# Patient Record
Sex: FEMALE | Race: WHITE | NOT HISPANIC OR LATINO | Employment: UNEMPLOYED | URBAN - METROPOLITAN AREA
[De-identification: names, ages, dates, MRNs, and addresses within clinical notes are randomized per-mention and may not be internally consistent; named-entity substitution may affect disease eponyms.]

---

## 2017-03-17 ENCOUNTER — APPOINTMENT (OUTPATIENT)
Dept: LAB | Facility: HOSPITAL | Age: 27
End: 2017-03-17
Attending: FAMILY MEDICINE
Payer: COMMERCIAL

## 2017-03-17 ENCOUNTER — TRANSCRIBE ORDERS (OUTPATIENT)
Dept: ADMINISTRATIVE | Facility: HOSPITAL | Age: 27
End: 2017-03-17

## 2017-03-17 DIAGNOSIS — Z11.1 SCREENING EXAMINATION FOR PULMONARY TUBERCULOSIS: ICD-10-CM

## 2017-03-17 DIAGNOSIS — Z01.84 IMMUNITY STATUS TESTING: Primary | ICD-10-CM

## 2017-03-17 DIAGNOSIS — Z01.84 IMMUNITY STATUS TESTING: ICD-10-CM

## 2017-03-17 LAB — RUBV IGG SERPL IA-ACNC: 155.4 IU/ML

## 2017-03-17 PROCEDURE — 36415 COLL VENOUS BLD VENIPUNCTURE: CPT

## 2017-03-17 PROCEDURE — 86765 RUBEOLA ANTIBODY: CPT

## 2017-03-17 PROCEDURE — 86480 TB TEST CELL IMMUN MEASURE: CPT

## 2017-03-17 PROCEDURE — 86762 RUBELLA ANTIBODY: CPT

## 2017-03-17 PROCEDURE — 86735 MUMPS ANTIBODY: CPT

## 2017-03-17 PROCEDURE — 86787 VARICELLA-ZOSTER ANTIBODY: CPT

## 2017-03-19 LAB
ANNOTATION COMMENT IMP: NORMAL
GAMMA INTERFERON BACKGROUND BLD IA-ACNC: 0.07 IU/ML
M TB IFN-G BLD-IMP: NEGATIVE
M TB IFN-G CD4+ BCKGRND COR BLD-ACNC: 0.06 IU/ML
M TB IFN-G CD4+ T-CELLS BLD-ACNC: 0.13 IU/ML
MITOGEN IGNF BLD-ACNC: >10 IU/ML
QUANTIFERON-TB GOLD IN TUBE: NORMAL
SERVICE CMNT-IMP: NORMAL

## 2017-03-21 LAB
MEV IGG SER QL: NORMAL
MUV IGG SER QL: NORMAL
VZV IGG SER IA-ACNC: ABNORMAL

## 2017-03-22 ENCOUNTER — GENERIC CONVERSION - ENCOUNTER (OUTPATIENT)
Dept: OTHER | Facility: OTHER | Age: 27
End: 2017-03-22

## 2017-03-29 ENCOUNTER — ALLSCRIPTS OFFICE VISIT (OUTPATIENT)
Dept: OTHER | Facility: OTHER | Age: 27
End: 2017-03-29

## 2017-05-04 ENCOUNTER — ALLSCRIPTS OFFICE VISIT (OUTPATIENT)
Dept: OTHER | Facility: OTHER | Age: 27
End: 2017-05-04

## 2017-12-07 ENCOUNTER — ALLSCRIPTS OFFICE VISIT (OUTPATIENT)
Dept: OTHER | Facility: OTHER | Age: 27
End: 2017-12-07

## 2017-12-07 DIAGNOSIS — Z34.01 ENCOUNTER FOR SUPERVISION OF NORMAL FIRST PREGNANCY IN FIRST TRIMESTER: ICD-10-CM

## 2017-12-12 ENCOUNTER — GENERIC CONVERSION - ENCOUNTER (OUTPATIENT)
Dept: OTHER | Facility: OTHER | Age: 27
End: 2017-12-12

## 2017-12-16 LAB — EXTERNAL HIV-1 ANTIBODY: NORMAL

## 2017-12-17 ENCOUNTER — LAB CONVERSION - ENCOUNTER (OUTPATIENT)
Dept: OTHER | Facility: OTHER | Age: 27
End: 2017-12-17

## 2017-12-17 LAB — TSH SERPL DL<=0.05 MIU/L-ACNC: 0.68 MIU/L

## 2017-12-18 ENCOUNTER — LAB CONVERSION - ENCOUNTER (OUTPATIENT)
Dept: OTHER | Facility: OTHER | Age: 27
End: 2017-12-18

## 2017-12-18 LAB
AB SCRN, RBC W/RFLX ID,TITER,AG (HISTORICAL): NORMAL
ABO GROUP BLD: NORMAL
BASOPHILS # BLD AUTO: 0.3 %
BASOPHILS # BLD AUTO: 27 CELLS/UL (ref 0–200)
DEPRECATED RDW RBC AUTO: 13.1 % (ref 11–15)
EOSINOPHIL # BLD AUTO: 1.3 %
EOSINOPHIL # BLD AUTO: 117 CELLS/UL (ref 15–500)
HCT VFR BLD AUTO: 37.9 % (ref 35–45)
HEPATITIS B SURFACE ANTIGEN (HISTORICAL): NORMAL
HGB BLD-MCNC: 12.8 G/DL (ref 11.7–15.5)
HIV AG/AB, 4TH GEN (HISTORICAL): NORMAL
LYMPHOCYTES # BLD AUTO: 1971 CELLS/UL (ref 850–3900)
LYMPHOCYTES # BLD AUTO: 21.9 %
MCH RBC QN AUTO: 29.7 PG (ref 27–33)
MCHC RBC AUTO-ENTMCNC: 33.8 G/DL (ref 32–36)
MCV RBC AUTO: 87.9 FL (ref 80–100)
MONOCYTES # BLD AUTO: 720 CELLS/UL (ref 200–950)
MONOCYTES (HISTORICAL): 8 %
NEUTROPHILS # BLD AUTO: 6165 CELLS/UL (ref 1500–7800)
NEUTROPHILS # BLD AUTO: 68.5 %
PLATELET # BLD AUTO: 256 THOUSAND/UL (ref 140–400)
PMV BLD AUTO: 10.6 FL (ref 7.5–12.5)
RBC # BLD AUTO: 4.31 MILLION/UL (ref 3.8–5.1)
RH BLD: NORMAL
RPR SCREEN (HISTORICAL): NORMAL
RUBELLA, IGG (HISTORICAL): 10.5 INDEX
TSH SERPL DL<=0.05 MIU/L-ACNC: 0.68 MIU/L
VARICELLA ZOSTER IGG (HISTORICAL): 273.5 INDEX
WBC # BLD AUTO: 9 THOUSAND/UL (ref 3.8–10.8)

## 2018-01-02 ENCOUNTER — ALLSCRIPTS OFFICE VISIT (OUTPATIENT)
Dept: OTHER | Facility: OTHER | Age: 28
End: 2018-01-02

## 2018-01-02 LAB — HCG, QUALITATIVE (HISTORICAL): NEGATIVE

## 2018-01-04 ENCOUNTER — GENERIC CONVERSION - ENCOUNTER (OUTPATIENT)
Dept: OTHER | Facility: OTHER | Age: 28
End: 2018-01-04

## 2018-01-04 LAB — CLINICAL COMMENT (HISTORICAL): NORMAL

## 2018-01-10 NOTE — PROGRESS NOTES
Assessment    1  Encounter for preventive health examination (V70 0) (Z00 00)   2  Body mass index (BMI) 20 0-20 9, adult (V85 1) (Z68 20)   3  Need for varicella vaccine (V05 4) (Z23)    Plan  Need for diphtheria-tetanus-pertussis (Tdap) vaccine    · Follow-up visit in 1 month Evaluation and Treatment  second varicella vaccine, RN visit   Status: Hold For - Scheduling  Requested for: [id number]   · Adacel 5-2-15 5 LF-MCG/0 5 Intramuscular Suspension; 0 5 ml IM x 1 dose; To Be Done: [id number]  Need for varicella vaccine    · Varicella; INJECT 0 5  ML Subcutaneous; To Be Done: [id number]    Discussion/Summary  health maintenance visit Currently, she eats a healthy diet  the risks and benefits of cervical cancer screening were discussed cervical cancer screening is current cervical cancer screening is managed by gyn Breast cancer screening: the risks and benefits of breast cancer screening were discussed and monthly self breast exam was advised  Colorectal cancer screening: colorectal cancer screening is not indicated  The immunizations will be given as outlined in the orders  Advice and education were given regarding nutrition, aerobic exercise, sunscreen use and seat belt use  Patient discussion: discussed with the patient  Chief Complaint  Seen for a CPE  er/cma  History of Present Illness  HPI: Here for pre-employment PE for [email or web address]  No complaints today  Had bloodwork for MMR and varicella immunity, as well as quantiferon gold  Review of Systems    Constitutional: No fever, no chills, feels well, no tiredness, no recent weight gain or weight loss  Eyes: No complaints of eye pain, no red eyes, no eyesight problems, no discharge, no dry eyes, no itching of eyes  ENT: no complaints of earache, no loss of hearing, no nose bleeds, no nasal discharge, no sore throat, no hoarseness     Cardiovascular: No complaints of slow heart rate, no fast heart rate, no chest pain, no palpitations, no leg claudication, no lower extremity edema  Respiratory: No complaints of shortness of breath, no wheezing, no cough, no SOB on exertion, no orthopnea, no PND  Gastrointestinal: No complaints of abdominal pain, no constipation, no nausea or vomiting, no diarrhea, no bloody stools  Genitourinary: No complaints of dysuria, no incontinence, no pelvic pain, no dysmenorrhea, no vaginal discharge or bleeding  Musculoskeletal: No complaints of arthralgias, no myalgias, no joint swelling or stiffness, no limb pain or swelling  Integumentary: No complaints of skin rash or lesions, no itching, no skin wounds, no breast pain or lump  Neurological: No complaints of headache, no confusion, no convulsions, no numbness, no dizziness or fainting, no tingling, no limb weakness, no difficulty walking  Psychiatric: Not suicidal, no sleep disturbance, no anxiety or depression, no change in personality, no emotional problems  Endocrine: No complaints of proptosis, no hot flashes, no muscle weakness, no deepening of the voice, no feelings of weakness  Hematologic/Lymphatic: No complaints of swollen glands, no swollen glands in the neck, does not bleed easily, does not bruise easily  Social History    · Former smoker (O18 85) (V43 808)    Current Meds   1  No Reported Medications Recorded   2  No Reported Medications Recorded    Allergies    1  No Known Drug Allergies    Vitals   Recorded: [id number] 11:15AM   Temperature 98 4 F   Heart Rate 80   Respiration 20   Systolic 96   Diastolic 60   Height 4 ft 10 in   Weight 99 lb    BMI Calculated 20 69   BSA Calculated 1 35   LMP [id number]     Physical Exam    Constitutional   General appearance: No acute distress, well appearing and well nourished  Eyes   Conjunctiva and lids: No swelling, erythema or discharge  Pupils and irises: Equal, round, reactive to light      Ears, Nose, Mouth, and Throat   External inspection of ears and nose: Normal     Otoscopic examination: Tympanic membranes translucent with normal light reflex  Canals patent without erythema  Hearing: Normal     Nasal mucosa, septum, and turbinates: Normal without edema or erythema  Lips, teeth, and gums: Normal, good dentition  Oropharynx: Normal with no erythema, edema, exudate or lesions  Neck   Neck: Supple, symmetric, trachea midline, no masses  Thyroid: Normal, no thyromegaly  Pulmonary   Respiratory effort: No increased work of breathing or signs of respiratory distress  Percussion of chest: Normal     Palpation of chest: Normal     Auscultation of lungs: Clear to auscultation  Cardiovascular   Palpation of heart: Normal PMI, no thrills  Auscultation of heart: Normal rate and rhythm, normal S1 and S2, no murmurs  Carotid pulses: 2+ bilaterally  Abdominal aorta: Normal     Femoral pulses: 2+ bilaterally  Pedal pulses: 2+ bilaterally  Examination of extremities for edema and/or varicosities: Normal     Abdomen   Abdomen: Non-tender, no masses  Liver and spleen: No hepatomegaly or splenomegaly  Examination for hernias: No hernia appreciated  Lymphatic   Palpation of lymph nodes in neck: No lymphadenopathy  Palpation of lymph nodes in axillae: No lymphadenopathy  Palpation of lymph nodes in groin: No lymphadenopathy  Palpation of lymph nodes in other areas: No lymphadenopathy  Musculoskeletal   Gait and station: Normal     Digits and nails: Normal without clubbing or cyanosis  Joints, bones, and muscles: Normal     Range of motion: Normal     Stability: Normal     Muscle strength/tone: Normal     Skin   Skin and subcutaneous tissue: Normal without rashes or lesions  Palpation of skin and subcutaneous tissue: Normal turgor  Neurologic   Cranial nerves: Cranial nerves II-XII intact  Reflexes: 2+ and symmetric  Sensation: No sensory loss      Psychiatric   Judgment and insight: Normal     Orientation to person, place, and time: Normal  Recent and remote memory: Intact  Mood and affect: Normal        Results/Data  PHQ-2 Adult Depression Screening 29Mar2017 11:19AM User, Ahs     Test Name Result Flag Reference   PHQ-2 Adult Depression Score 0     Over the last two weeks, how often have you been bothered by any of the following problems? Little interest or pleasure in doing things: Not at all - 0  Feeling down, depressed, or hopeless: Not at all - 0   PHQ-2 Adult Depression Screening Negative         Procedure    Procedure: Visual Acuity Test    Indication: routine screening  Inforrmation supplied by a Snellen chart     Results: 20/13 in both eyes without corrective device, 20/13 in the right eye without corrective device, 20/20 in the left eye without corrective device      Signatures   Electronically signed by : Townsend Schlatter, M D ; Mar 29 2017 11:46AM EST                       (Author)

## 2018-01-11 ENCOUNTER — ALLSCRIPTS OFFICE VISIT (OUTPATIENT)
Dept: PERINATAL CARE | Facility: CLINIC | Age: 28
End: 2018-01-11
Payer: COMMERCIAL

## 2018-01-11 PROCEDURE — 76805 OB US >/= 14 WKS SNGL FETUS: CPT | Performed by: OBSTETRICS & GYNECOLOGY

## 2018-01-12 VITALS
WEIGHT: 99 LBS | SYSTOLIC BLOOD PRESSURE: 96 MMHG | DIASTOLIC BLOOD PRESSURE: 60 MMHG | TEMPERATURE: 98.3 F | BODY MASS INDEX: 20.78 KG/M2 | RESPIRATION RATE: 20 BRPM | HEART RATE: 76 BPM | HEIGHT: 58 IN

## 2018-01-14 VITALS
BODY MASS INDEX: 20.78 KG/M2 | RESPIRATION RATE: 20 BRPM | DIASTOLIC BLOOD PRESSURE: 60 MMHG | HEART RATE: 80 BPM | HEIGHT: 58 IN | WEIGHT: 99 LBS | TEMPERATURE: 98.4 F | SYSTOLIC BLOOD PRESSURE: 96 MMHG

## 2018-01-15 NOTE — RESULT NOTES
Verified Results  (1) RUBEOLA ANTIBODIES, IGG [id number] 07:34AM Hotchkin, Severa Crow     Test Name Result Flag Reference   Rubeola AB, IgG IMMUNE  IMMUNE   Interpretation:    IMMUNE     - Presumed immune to Measles infection  EQUIVOCAL  - Suggest repeat in 2-4 weeks  NON-IMMUNE - Presumed non-immune to Measles infection  (1) MUMPS  ANTIBODIES, IGG [id number] 07:34AM Hotchkin, Severa Crow     Test Name Result Flag Reference   MUMPS IgG IMMUNE  IMMUNE   IMMUNE - Presumed immune to mumps infection  INTERPRETATION:    IMMUNE     - Presumed immune to Mumps IgG infection  EQUIVOCAL  - Suggest repeat testing in 2-4 weeks  NON-IMMUNE - Presumed non-immune to Mumps IgG infection  (1) VARICELLA ZOSTER IMMUNITY(IGG) [id number] 07:34AM Hotchkin, Severa Crow     Test Name Result Flag Reference   ANJALI ZOSTER IGG NON-IMMUNE A IMMUNE   INTERPRETATION:    IMMUNE     - Presumed immune to VZV IgG infection  EQUIVOCAL  - Suggest repeat testing in 2-4 weeks  NON-IMMUNE - Presumed non-immune to VZV IgG infection

## 2018-01-22 ENCOUNTER — GENERIC CONVERSION - ENCOUNTER (OUTPATIENT)
Dept: OTHER | Facility: OTHER | Age: 28
End: 2018-01-22

## 2018-01-23 VITALS
WEIGHT: 108 LBS | HEIGHT: 58 IN | HEART RATE: 88 BPM | BODY MASS INDEX: 22.67 KG/M2 | RESPIRATION RATE: 16 BRPM | SYSTOLIC BLOOD PRESSURE: 118 MMHG | DIASTOLIC BLOOD PRESSURE: 64 MMHG

## 2018-01-23 VITALS
HEIGHT: 58 IN | BODY MASS INDEX: 22.49 KG/M2 | DIASTOLIC BLOOD PRESSURE: 62 MMHG | SYSTOLIC BLOOD PRESSURE: 104 MMHG | HEART RATE: 88 BPM | WEIGHT: 107.13 LBS | RESPIRATION RATE: 16 BRPM

## 2018-01-23 NOTE — RESULT NOTES
Verified Results  (Q) THINPREP TIS PAP RFX HR HPV DNA AND C  TRACHOMATIS AND N  GONORRHOEAE [id number] 12:00AM Sharon Tam     Test Name Result Flag Reference   CLINICAL INFORMATION: None given     LMP: NONE GIVEN     PREV  PAP: NONE GIVEN     PREV  BX: NONE GIVEN     SOURCE: [facility]     STATEMENT OF ADEQUACY:      Satisfactory for evaluation  Endocervical/transformation zone component absent  Age and/or menstrual status not provided   INTERPRETATION/RESULT:      Negative for intraepithelial lesion or malignancy  COMMENT:      This Pap test has been evaluated with computer  assisted technology  CYTOTECHNOLOGIST:      ASHLEY HOFFMAN(ASCP)  CT screening location: [address] [facility], [address] [place] TRACHOMATIS$RNA, TMA NOT DETECTED  NOT Extension [place], TMA NOT DETECTED  NOT DETECTED   This test was performed using the APTIMA COMBO2 Assay  (Rosaura 32 )  The analytical performance characteristics of this   assay, when used to test SurePath specimens have  been determined by [employer]               Signatures   Electronically signed by : TADEO Duran ; Jan 4 2018  3:55PM EST                       (Author)

## 2018-01-23 NOTE — MISCELLANEOUS
Dear Jimmy Moffett,  I am happy to report that your Pap test collected during your recent  exam was normal  Based on the most recent guidelines, you will be due for your next Pap test in 3 years  However, I will continue to see you annually for your [facility] visit  If you have any questions, please do not hesitate to contact my office     Sincerely,    Kristi Martinez MD

## 2018-01-23 NOTE — MISCELLANEOUS
Message   Recorded as Task   Date: 12/07/2017 04:35 PM, Created By: Anisha Blanchard   Task Name: Care Coordination   Assigned To: Anisha Blanchard   Regarding Patient: Ermelinda Gilmore, Status: Active   CommentSantiago Don - 07 Dec 2017 4:35 PM     TASK CREATED  NEW OB INTAKE  INFERTILTY RECORDS ON YOUR DESK TO REVIEW  RELEASE OF RECORDS FORM SIGNED TO GET US RESULTS  PLEASE REVIEW TSH LEVEL - PT WOULD LIKE TO KNOW IF SYNTHROID NEED TO BE ADJUSTED  Antione Armas - 12 Dec 2017 10:00 AM     TASK REPLIED TO: Previously Assigned To [employer], [employer] and Joosy from august - looked good, will be repeating q trimester    everything else looks good  Thanks   Anisha Blanchard - 12 Dec 2017 11:20 AM     TASK EDITED  Spoke with Dr Blanton Party - Add TSH to current blood work  Spoke with patient, advised to continue synthroid right now and we will get tsh level with current blood work  Pt to get labs drawn on Saturday  Pt  to [facility] so I can fax lab slip  Anisha Blanchard - 12 Dec 2017 12:36 PM     TASK EDITED  Phone call from Walter  44  (pt ) please fax [facility] slip to [facility]  Faxed per request [phone]  Active Problems    1  Body mass index (BMI) 20 0-20 9, adult (V85 1) (Z68 20)   2  Encounter for supervision of normal first pregnancy in first trimester (V22 0) (Z34 01)   3  Hypothyroidism (244 9) (E03 9)   4  Irregular menstrual cycle (626 4) (N92 6)    Current Meds   1  Iron Supplement TABS; Therapy: (Recorded:25Ruc3878) to Recorded   2  Levothyroxine Sodium 75 MCG Oral Tablet; Therapy: (Recorded:80Nlw4279) to Recorded   3  Prenatal Vitamins TABS; Therapy: (Recorded:43Ogg9835) to Recorded   4  Vitamin D3 TABS; Therapy: (Recorded:61Xez9983) to Recorded    Allergies    1  Clomid    2  No Known Environmental Allergies   3   No Known Food Allergies    Signatures   Electronically signed by : Gideon Angulo, ; Dec 12 2017 12: 38PM EST                       (Author)

## 2018-01-24 VITALS
DIASTOLIC BLOOD PRESSURE: 61 MMHG | WEIGHT: 111 LBS | BODY MASS INDEX: 23.3 KG/M2 | SYSTOLIC BLOOD PRESSURE: 109 MMHG | HEIGHT: 58 IN

## 2018-01-24 NOTE — MISCELLANEOUS
Message   Recorded as Task   Date: 01/22/2018 10:01 AM, Created By: Sissy Hollis   Task Name: Follow Up   Assigned To: Sissy Hollis   Regarding Patient: Aishwarya Sarabia, Status: Active   CommentDarby Mews - 22 Jan 2018 10:01 AM     TASK CREATED  Caller: Sincer, Spouse; General Medical Question  18w OB  calling states wife is c/o transparent discharge ONCE a day for the past week  States she notices after a nap or when she gets up in the the morning she has approximately two drops of liquid on her underwear  States it has a yeast smell to it but it does not look like a yeast discharge  Just wet look to underwear  Does not smell like urine  No itching  Denies cramping or ctx  Lower back feels tender  Started doing exercises in book  Advised to wait on exercises for now and monitor  Routing to [facility] for advise  Please call  at [address] [facility] - 22 Jan 2018 10:06 AM     TASK REPLIED TO: Previously Assigned To Scarlett Colon                      agree with plan   Sissy Hollis - 22 Jan 2018 10:21 AM     TASK EDITED  Spoke with Walter Prescott 44  Advised to monitor  If increase or starts with cramp, ctx please call the office  Verbalized understanding  Active Problems    1  Body mass index (BMI) 20 0-20 9, adult (V85 1) (Z68 20)   2  Encounter for supervision of normal first pregnancy in first trimester (V22 0) (Z34 01)   3  Subclinical hypothyroidism (244 8) (E03 9)   4  Supervision of high risk pregnancy in second trimester (V23 9) (O09 92)   5  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

## 2018-01-30 ENCOUNTER — OB ABSTRACT (OUTPATIENT)
Dept: OBGYN CLINIC | Facility: CLINIC | Age: 28
End: 2018-01-30

## 2018-01-30 RX ORDER — FERROUS SULFATE 325(65) MG
TABLET ORAL
COMMUNITY

## 2018-01-30 RX ORDER — LEVOTHYROXINE SODIUM 0.07 MG/1
TABLET ORAL
COMMUNITY
End: 2018-01-31 | Stop reason: SDUPTHER

## 2018-01-30 RX ORDER — MELATONIN: COMMUNITY

## 2018-01-31 ENCOUNTER — OB ABSTRACT (OUTPATIENT)
Dept: OBGYN CLINIC | Facility: CLINIC | Age: 28
End: 2018-01-31

## 2018-01-31 ENCOUNTER — ROUTINE PRENATAL (OUTPATIENT)
Dept: OBGYN CLINIC | Facility: CLINIC | Age: 28
End: 2018-01-31

## 2018-01-31 VITALS — WEIGHT: 112 LBS | BODY MASS INDEX: 23.41 KG/M2 | DIASTOLIC BLOOD PRESSURE: 60 MMHG | SYSTOLIC BLOOD PRESSURE: 102 MMHG

## 2018-01-31 DIAGNOSIS — E03.8 OTHER SPECIFIED HYPOTHYROIDISM: ICD-10-CM

## 2018-01-31 DIAGNOSIS — Z3A.19 19 WEEKS GESTATION OF PREGNANCY: Primary | ICD-10-CM

## 2018-01-31 PROBLEM — E03.9 HYPOTHYROID IN PREGNANCY, ANTEPARTUM, SECOND TRIMESTER: Status: ACTIVE | Noted: 2018-01-31

## 2018-01-31 PROBLEM — O99.282 HYPOTHYROID IN PREGNANCY, ANTEPARTUM, SECOND TRIMESTER: Status: ACTIVE | Noted: 2018-01-31

## 2018-01-31 PROCEDURE — PNV: Performed by: OBSTETRICS & GYNECOLOGY

## 2018-01-31 RX ORDER — LEVOTHYROXINE SODIUM 0.07 MG/1
75 TABLET ORAL
Qty: 90 TABLET | Refills: 1 | Status: SHIPPED | OUTPATIENT
Start: 2018-01-31 | End: 2018-02-20 | Stop reason: SDUPTHER

## 2018-01-31 RX ORDER — LEVOTHYROXINE SODIUM 0.07 MG/1
75 TABLET ORAL DAILY
Qty: 90 TABLET | Refills: 1 | Status: SHIPPED | OUTPATIENT
Start: 2018-01-31

## 2018-01-31 NOTE — PROGRESS NOTES
[place] to go to [place]  Discussed frequent movement, leg exercises, and compression stockings    Will call to scheduled level 2

## 2018-01-31 NOTE — PROGRESS NOTES
No complaints  Wants to discuss nurse calls   Wants to discuss [facility] issue regarding urine   Found out sex of baby ( girl)   2nd trimester baby and me note due   Requests levothyroxine refills

## 2018-02-20 ENCOUNTER — ROUTINE PRENATAL (OUTPATIENT)
Dept: PERINATAL CARE | Facility: CLINIC | Age: 28
End: 2018-02-20
Payer: COMMERCIAL

## 2018-02-20 VITALS
WEIGHT: 116.2 LBS | HEIGHT: 58 IN | HEART RATE: 102 BPM | SYSTOLIC BLOOD PRESSURE: 99 MMHG | DIASTOLIC BLOOD PRESSURE: 63 MMHG | BODY MASS INDEX: 24.39 KG/M2

## 2018-02-20 DIAGNOSIS — Z36.86 ENCOUNTER FOR ANTENATAL SCREENING FOR CERVICAL LENGTH: ICD-10-CM

## 2018-02-20 DIAGNOSIS — Z3A.21 21 WEEKS GESTATION OF PREGNANCY: ICD-10-CM

## 2018-02-20 DIAGNOSIS — E03.8 SUBCLINICAL HYPOTHYROIDISM: Primary | ICD-10-CM

## 2018-02-20 PROBLEM — O31.10X0 VANISHING TWIN SYNDROME: Status: ACTIVE | Noted: 2018-01-11

## 2018-02-20 PROCEDURE — 76817 TRANSVAGINAL US OBSTETRIC: CPT | Performed by: OBSTETRICS & GYNECOLOGY

## 2018-02-20 PROCEDURE — 99213 OFFICE O/P EST LOW 20 MIN: CPT | Performed by: OBSTETRICS & GYNECOLOGY

## 2018-02-20 PROCEDURE — 76805 OB US >/= 14 WKS SNGL FETUS: CPT | Performed by: OBSTETRICS & GYNECOLOGY

## 2018-02-22 ENCOUNTER — DOCUMENTATION (OUTPATIENT)
Dept: OBGYN CLINIC | Facility: CLINIC | Age: 28
End: 2018-02-22

## 2018-02-22 NOTE — PROGRESS NOTES
US review: ORTEGA changed  Repeat TSH at next visit, and if still aroun 0 6, can decrease synthroid    TSH q trimester

## 2018-02-26 NOTE — PROGRESS NOTES
2018         RE: Johanna Leydi                           To: [id number] [address] [place] [address] [address]   [facility]   MR#: [id number]                                  [facility]   : Rabia [address]: [phone]:SUELLEN MIMS, [facility]   (Exam #: [id number])                           Fax: [phone]      The LMP of this 28year old,  G1, P0-0-0-0 patient was SEP 25 2017, giving   her an ORTEGA of 2018 and a current gestational age of 12 weeks 3 days   by dates  A sonographic examination was performed on 2018 using   real time equipment  The ultrasound examination was performed using   abdominal technique  The patient has a BMI of 23 2  Her blood pressure   today was 109/61  Earliest US on record: 17  7w0d  18  ORTEGA      Stoney is on prenatal vitamins, iron supplements, levothyroxine 75   micrograms and vitamin D3 tabs daily  She denies any known drug   allergies  She has irregular periods and was following with Dr Janiya Garza   from infertility  She had one cycle of Clomid which was not repeated    as she got pregnant on her own spontaneously in a different cycle  During   her infertility work up she was found to have subclinical hypothyroidism   that she was treated with levothyroxine for  She also was told she had   twins with a loss of one twin at seven weeks  She declined any genetic   screening in this pregnancy  she denies any use of cigarettes, alcohol or   illicit drugs  She denies any other past medical or surgical history or   any 1st generation family history of hypertension, diabetes, thrombosis or   congenital anomalies  She reports she did receive a flu shot this year        Cardiac motion was observed at 167 bpm       INDICATIONS      early fetal anatomy and growth   hypothyroidism   declines genetic screening      Exam Types      Level I      RESULTS      Fetus # 1 of 1   Breech presentation   Fetal growth appeared normal   Placenta Location = Anterior   No placenta previa   Placenta Grade = I      AMNIOTIC FLUID         Largest Vertical Pocket = 4 5 cm   Amniotic Fluid: Normal      MEASUREMENTS (* Included In Average GA)      AC               9 8 cm        15 weeks 4 days* (35%)   BPD              3 2 cm        15 weeks 6 days*   HC              11 9 cm        15 weeks 6 days*   Femur            1 8 cm        15 weeks 1 day *      Nuchal Fold      2 3 mm   NBL              3 8 mm      Humerus          2 1 cm        16 weeks 2 days  (51%)      Cerebellum       1 5 cm        16 weeks 1 day   CisternaMagna    3 6 mm      HC/AC           1 21   FL/AC           0 18   FL/BPD          0 56   EFW (Ac/Fl/Hc)   127 grams - 0 lbs 4 oz      THE AVERAGE GESTATIONAL AGE is 15 weeks 4 days +/- 7 days  ANATOMY      Head                                    See Details   Face/Neck                               See Details   Th  Cav  Normal   Heart                                   See Details   Abd  Cav  Normal   Stomach                                 Normal   Right Kidney                            Normal   Left Kidney                             Normal   Bladder                                 Normal   Abd  Wall                               Normal   Spine                                   See Details   Extrems                                 See Details   Genitalia                               Normal   Placenta                                Normal   Umbl  Cord                              Normal   Uterus                                  Normal   PCI                                     Normal      ANATOMY COMMENTS      Her survey of the fetal anatomy is not complete  No fetal structural abnormality or ultrasound marker for aneuploidy is   identified on the Level II ultrasound study today    The missed or limited   views above are secondary to her early gestational age  Fetal growth and   amniotic fluid volume appear normal   Active movement of the fetal body &   extremities was seen  There is no suspicion of a subchorionic bleed  The   placental cord insertion was normal       ADNEXA      The left ovary appeared normal and measured 1 3 x 2 9 x 0 8 cm with a   volume of 1 5 cc  The right ovary appeared normal and measured 2 5 x 2 7 x   1 7 cm with a volume of 5 9 cc  IMPRESSION      Agu[place] IUP   15 weeks and 4 days by this ultrasound  (ORTEGA=2018)   Breech presentation   Fetal growth appeared normal   Regular fetal heart rate of 167 bpm   Anterior placenta   No placenta previa      Ramer Ulisses      Dear Dr Chrystie Ormond,      Thank you for requesting a  consultation on your patient Ms Lyudmila Lockhart for the following indications:  history of a twin pregnancy   with a loss at seven weeks of one of the twins and history of subclinical   hypothyroidism  The patient was informed of the findings and counseled about the   limitations of the exam in detecting all forms of fetal congenital   abnormalities  She denies any vaginal bleeding or uterine cramping/contractions  Exam shows she is comfortable and her abdomen is non tender  Follow up recommended:   1  Recommend a follow-up anatomy scan at 20 weeks  2   Recommend  follow-up of her TSH Q trimester in this pregnancy and   titrating her medication to keep her TSH less then three in the 2nd and   3rd trimester  The majority of time (greater then 50%) was spent on counseling and   coordination of care of this patient and /or family member  Approximate   face to face time was 15 minutes  DIEGO Adan M D  Maternal-Fetal Medicine   Electronically signed 18 13:33           Electronically signed Jed AVILA    2018 11:08AM EST Acknowledgement

## 2018-03-05 ENCOUNTER — ROUTINE PRENATAL (OUTPATIENT)
Dept: OBGYN CLINIC | Facility: CLINIC | Age: 28
End: 2018-03-05

## 2018-03-05 VITALS — DIASTOLIC BLOOD PRESSURE: 62 MMHG | WEIGHT: 117 LBS | SYSTOLIC BLOOD PRESSURE: 98 MMHG | BODY MASS INDEX: 24.45 KG/M2

## 2018-03-05 DIAGNOSIS — Z34.83 ENCOUNTER FOR SUPERVISION OF OTHER NORMAL PREGNANCY IN THIRD TRIMESTER: Primary | ICD-10-CM

## 2018-03-05 DIAGNOSIS — E03.8 SUBCLINICAL HYPOTHYROIDISM: ICD-10-CM

## 2018-03-05 DIAGNOSIS — Z3A.23 23 WEEKS GESTATION OF PREGNANCY: ICD-10-CM

## 2018-03-05 PROCEDURE — PNV: Performed by: OBSTETRICS & GYNECOLOGY

## 2018-03-07 NOTE — PROGRESS NOTES
Education  Baby & Me Education 1st Trimester:   First Trimester Education provided:   benefits of breastfeeding, importance of exclusive breastfeeding, early initiation of breastfeeding, exclusive breastfeeding for the first 6 months and Pregnancy Essentials Reference Guide given   The patient is planning on breastfeeding  Individualized education given: BABY AND ME SUPPORT [facility] AND SUSAN INFORMATION GIVEN AND REVIEWED     Prenatal education provided by: Saulo Peres RN      Signatures   Electronically signed by : Meka Hartman, ; Dec  7 2017  4:25PM EST                       (Author)

## 2018-03-20 ENCOUNTER — TELEPHONE (OUTPATIENT)
Dept: OBGYN CLINIC | Facility: CLINIC | Age: 28
End: 2018-03-20

## 2018-03-20 NOTE — TELEPHONE ENCOUNTER
25w2d OB  calling states wife is having few drops of blood from nose only in the morning when she wakes up  Advised this can be normal in pregnancy  Advised increase fluid intake, cold steam humidifier to increase moisture in the home, small amount of Vaseline in the nose can also help with dryness  Routing to provider for further recommendations

## 2018-04-12 DIAGNOSIS — R73.09 ELEVATED GLUCOSE: Primary | ICD-10-CM

## 2018-04-12 LAB
BASOPHILS # BLD AUTO: 30 CELLS/UL (ref 0–200)
BASOPHILS NFR BLD AUTO: 0.3 %
EOSINOPHIL # BLD AUTO: 190 CELLS/UL (ref 15–500)
EOSINOPHIL NFR BLD AUTO: 1.9 %
ERYTHROCYTE [DISTWIDTH] IN BLOOD BY AUTOMATED COUNT: 12.1 % (ref 11–15)
GLUCOSE 1H P 50 G GLC PO SERPL-MCNC: 155 MG/DL
HCT VFR BLD AUTO: 34.4 % (ref 35–45)
HGB BLD-MCNC: 12 G/DL (ref 11.7–15.5)
LYMPHOCYTES # BLD AUTO: 2020 CELLS/UL (ref 850–3900)
LYMPHOCYTES NFR BLD AUTO: 20.2 %
MCH RBC QN AUTO: 32.7 PG (ref 27–33)
MCHC RBC AUTO-ENTMCNC: 34.9 G/DL (ref 32–36)
MCV RBC AUTO: 93.7 FL (ref 80–100)
MONOCYTES # BLD AUTO: 820 CELLS/UL (ref 200–950)
MONOCYTES NFR BLD AUTO: 8.2 %
NEUTROPHILS # BLD AUTO: 6940 CELLS/UL (ref 1500–7800)
NEUTROPHILS NFR BLD AUTO: 69.4 %
PLATELET # BLD AUTO: 209 THOUSAND/UL (ref 140–400)
PMV BLD REES-ECKER: 11.4 FL (ref 7.5–12.5)
RBC # BLD AUTO: 3.67 MILLION/UL (ref 3.8–5.1)
SERVICE CMNT-IMP: ABNORMAL
T4 SERPL-MCNC: 10.6 MCG/DL (ref 4.5–12)
TSH SERPL-ACNC: 1.15 MIU/L
WBC # BLD AUTO: 10 THOUSAND/UL (ref 3.8–10.8)

## 2018-04-16 ENCOUNTER — ROUTINE PRENATAL (OUTPATIENT)
Dept: OBGYN CLINIC | Facility: CLINIC | Age: 28
End: 2018-04-16
Payer: COMMERCIAL

## 2018-04-16 VITALS — BODY MASS INDEX: 26.33 KG/M2 | SYSTOLIC BLOOD PRESSURE: 110 MMHG | WEIGHT: 126 LBS | DIASTOLIC BLOOD PRESSURE: 74 MMHG

## 2018-04-16 DIAGNOSIS — Z23 NEED FOR TDAP VACCINATION: ICD-10-CM

## 2018-04-16 DIAGNOSIS — Z3A.29 29 WEEKS GESTATION OF PREGNANCY: Primary | ICD-10-CM

## 2018-04-16 PROCEDURE — PNV: Performed by: OBSTETRICS & GYNECOLOGY

## 2018-04-16 PROCEDURE — 90471 IMMUNIZATION ADMIN: CPT

## 2018-04-16 PROCEDURE — 90715 TDAP VACCINE 7 YRS/> IM: CPT

## 2018-04-16 NOTE — PROGRESS NOTES
Reviewed 28 week labs    Will do 3 hour glucose test  Will continue thyroid medication  Offered PT for sciatic nerve pain and low back pain -patient declined  Reviewed  labor precautions  Review 28 week folder  Tdap given  Follow-up in 2 weeks

## 2018-04-16 NOTE — PROGRESS NOTES
Red folder/breast pump given   3rd trimester baby and me note due   Wants to discuss 1 hr glucose test    Wants to discuss Thyroid med because [facility] stated she could stop it   C/o sciatic nerve pain , especially when lying on her side   C/o heel pain   C/o lower back pain   C/o tightening one day underneath breasts with lower pelvic cramping  hasn't had it since

## 2018-04-20 LAB
GLUCOSE 1H P CHAL SERPL-MCNC: 157 MG/DL
GLUCOSE 2H P CHAL SERPL-MCNC: 109 MG/DL
GLUCOSE 3H P 100 G GLC PO SERPL-MCNC: 102 MG/DL
GLUCOSE P FAST SERPL-MCNC: 70 MG/DL (ref 65–94)

## 2018-05-01 ENCOUNTER — ROUTINE PRENATAL (OUTPATIENT)
Dept: OBGYN CLINIC | Facility: CLINIC | Age: 28
End: 2018-05-01

## 2018-05-01 VITALS — SYSTOLIC BLOOD PRESSURE: 108 MMHG | WEIGHT: 125 LBS | BODY MASS INDEX: 26.13 KG/M2 | DIASTOLIC BLOOD PRESSURE: 60 MMHG

## 2018-05-01 DIAGNOSIS — Z3A.31 31 WEEKS GESTATION OF PREGNANCY: ICD-10-CM

## 2018-05-01 DIAGNOSIS — Z34.03 ENCOUNTER FOR SUPERVISION OF NORMAL FIRST PREGNANCY IN THIRD TRIMESTER: Primary | ICD-10-CM

## 2018-05-01 PROCEDURE — PNV: Performed by: OBSTETRICS & GYNECOLOGY

## 2018-05-01 NOTE — PROGRESS NOTES
Wants copy of consent - given to patient  Ok with blood transfusion if needed  Rib pain - likely positional/baby pressing on nerve  Discussed cat/cow stretches, position change

## 2018-05-07 ENCOUNTER — ULTRASOUND (OUTPATIENT)
Dept: PERINATAL CARE | Facility: CLINIC | Age: 28
End: 2018-05-07
Payer: COMMERCIAL

## 2018-05-07 VITALS
BODY MASS INDEX: 26.91 KG/M2 | WEIGHT: 128.2 LBS | HEIGHT: 58 IN | DIASTOLIC BLOOD PRESSURE: 74 MMHG | HEART RATE: 98 BPM | SYSTOLIC BLOOD PRESSURE: 105 MMHG

## 2018-05-07 DIAGNOSIS — O40.3XX0 POLYHYDRAMNIOS IN THIRD TRIMESTER COMPLICATION, SINGLE OR UNSPECIFIED FETUS: Primary | ICD-10-CM

## 2018-05-07 DIAGNOSIS — E03.8 SUBCLINICAL HYPOTHYROIDISM: ICD-10-CM

## 2018-05-07 DIAGNOSIS — Z3A.32 32 WEEKS GESTATION OF PREGNANCY: ICD-10-CM

## 2018-05-07 PROCEDURE — 76816 OB US FOLLOW-UP PER FETUS: CPT | Performed by: OBSTETRICS & GYNECOLOGY

## 2018-05-07 PROCEDURE — 99213 OFFICE O/P EST LOW 20 MIN: CPT | Performed by: OBSTETRICS & GYNECOLOGY

## 2018-05-07 NOTE — PROGRESS NOTES
Normal fetal growth and anatomy seen  Her missed anatomy is completed today  Today polyhydramnios is seen  No evidence for malformations to suggest aneuploidy, fetal GI obstruction, fetal anemia, or maternal diabetes is found in her work up  She recently passed her 3hr ogtt  Recommend an ANTONI in two weeks and if still elevated then would start weekly NST/ANTONI till her polyhydramnios resolves as the condition is associated with an increased risk for stillbirth  Her TSH of 1 15 on 04/11 is in the recommended range for pregnancy  She should continue her levothyroxine at 75 micrograms daily  With a diagnosis of subclinical hypothyroidism she can stop her medication after birth as long as she has another TSH drawn at six weeks postpartum to prove her TSH remains in the normal range for a non pregnant patient      Vani Fay MD

## 2018-05-15 ENCOUNTER — ROUTINE PRENATAL (OUTPATIENT)
Dept: OBGYN CLINIC | Facility: CLINIC | Age: 28
End: 2018-05-15

## 2018-05-15 VITALS — DIASTOLIC BLOOD PRESSURE: 56 MMHG | SYSTOLIC BLOOD PRESSURE: 94 MMHG | BODY MASS INDEX: 26.58 KG/M2 | WEIGHT: 127.2 LBS

## 2018-05-15 DIAGNOSIS — Z3A.32 32 WEEKS GESTATION OF PREGNANCY: Primary | ICD-10-CM

## 2018-05-15 DIAGNOSIS — O40.3XX0 POLYHYDRAMNIOS IN THIRD TRIMESTER COMPLICATION, SINGLE OR UNSPECIFIED FETUS: ICD-10-CM

## 2018-05-15 PROCEDURE — PNV: Performed by: OBSTETRICS & GYNECOLOGY

## 2018-05-15 NOTE — PROGRESS NOTES
58-year-old G1 at 33 2 weeks gestational age  Recently diagnosed with polyhydramnios - repeat fluid check next week at    Reviewed delivery consent    Signed today  Reviewed  labor precautions  Follow-up in 2 weeks

## 2018-05-21 ENCOUNTER — ROUTINE PRENATAL (OUTPATIENT)
Dept: PERINATAL CARE | Facility: CLINIC | Age: 28
End: 2018-05-21
Payer: COMMERCIAL

## 2018-05-21 VITALS
BODY MASS INDEX: 25.84 KG/M2 | HEART RATE: 90 BPM | DIASTOLIC BLOOD PRESSURE: 65 MMHG | HEIGHT: 59 IN | WEIGHT: 128.2 LBS | SYSTOLIC BLOOD PRESSURE: 100 MMHG

## 2018-05-21 DIAGNOSIS — O40.3XX0 POLYHYDRAMNIOS IN THIRD TRIMESTER COMPLICATION, SINGLE OR UNSPECIFIED FETUS: Primary | ICD-10-CM

## 2018-05-21 DIAGNOSIS — Z3A.32 32 WEEKS GESTATION OF PREGNANCY: ICD-10-CM

## 2018-05-21 PROCEDURE — 76815 OB US LIMITED FETUS(S): CPT | Performed by: OBSTETRICS & GYNECOLOGY

## 2018-05-21 PROCEDURE — 59025 FETAL NON-STRESS TEST: CPT | Performed by: OBSTETRICS & GYNECOLOGY

## 2018-05-22 NOTE — PROGRESS NOTES
Her polyhydramnios continues to be present    Recommend weekly NST / ANTONI till either delivery or returns to normal   Farideh Gabriel MD

## 2018-05-29 ENCOUNTER — ROUTINE PRENATAL (OUTPATIENT)
Dept: OBGYN CLINIC | Facility: CLINIC | Age: 28
End: 2018-05-29

## 2018-05-29 VITALS — BODY MASS INDEX: 25.73 KG/M2 | SYSTOLIC BLOOD PRESSURE: 98 MMHG | DIASTOLIC BLOOD PRESSURE: 58 MMHG | WEIGHT: 127.4 LBS

## 2018-05-29 DIAGNOSIS — Z3A.35 35 WEEKS GESTATION OF PREGNANCY: ICD-10-CM

## 2018-05-29 DIAGNOSIS — O40.3XX0 POLYHYDRAMNIOS IN THIRD TRIMESTER COMPLICATION, SINGLE OR UNSPECIFIED FETUS: Primary | ICD-10-CM

## 2018-05-29 PROCEDURE — PNV: Performed by: OBSTETRICS & GYNECOLOGY

## 2018-05-29 NOTE — PROGRESS NOTES
25-year-old G1 at 34 2 weeks gestational age  Overall she is feeling well  Continues to have polyhydramnios -weekly NST and ANTONI -appointment is on Friday  Transverse lie today  Reviewed  labor precautions  Reviewed birth plan -explained that nothing is set stone and that patient may change her planned at any point  Reviewed that if baby is not vertex at next visit we will need to discuss version versus expected management versus planned   Follow-up in 1 week

## 2018-05-29 NOTE — PROGRESS NOTES
Pt would like to discuss her birth plan  Pt feels good, no complaints  NST/ANTONI scheduled at [facility] on 6/1/18

## 2018-06-01 ENCOUNTER — ROUTINE PRENATAL (OUTPATIENT)
Dept: PERINATAL CARE | Facility: CLINIC | Age: 28
End: 2018-06-01
Payer: COMMERCIAL

## 2018-06-01 VITALS
DIASTOLIC BLOOD PRESSURE: 73 MMHG | WEIGHT: 128.6 LBS | HEART RATE: 101 BPM | SYSTOLIC BLOOD PRESSURE: 105 MMHG | BODY MASS INDEX: 25.92 KG/M2 | HEIGHT: 59 IN

## 2018-06-01 DIAGNOSIS — Z3A.35 35 WEEKS GESTATION OF PREGNANCY: ICD-10-CM

## 2018-06-01 DIAGNOSIS — O40.3XX0 POLYHYDRAMNIOS IN THIRD TRIMESTER COMPLICATION, SINGLE OR UNSPECIFIED FETUS: Primary | ICD-10-CM

## 2018-06-01 PROCEDURE — 59025 FETAL NON-STRESS TEST: CPT | Performed by: OBSTETRICS & GYNECOLOGY

## 2018-06-01 PROCEDURE — 76815 OB US LIMITED FETUS(S): CPT | Performed by: OBSTETRICS & GYNECOLOGY

## 2018-06-05 ENCOUNTER — ROUTINE PRENATAL (OUTPATIENT)
Dept: OBGYN CLINIC | Facility: CLINIC | Age: 28
End: 2018-06-05

## 2018-06-05 VITALS — DIASTOLIC BLOOD PRESSURE: 60 MMHG | SYSTOLIC BLOOD PRESSURE: 102 MMHG | BODY MASS INDEX: 25.85 KG/M2 | WEIGHT: 128 LBS

## 2018-06-05 DIAGNOSIS — O40.3XX0 POLYHYDRAMNIOS IN THIRD TRIMESTER COMPLICATION, SINGLE OR UNSPECIFIED FETUS: ICD-10-CM

## 2018-06-05 DIAGNOSIS — Z34.03 ENCOUNTER FOR SUPERVISION OF NORMAL FIRST PREGNANCY IN THIRD TRIMESTER: Primary | ICD-10-CM

## 2018-06-05 DIAGNOSIS — Z3A.36 36 WEEKS GESTATION OF PREGNANCY: ICD-10-CM

## 2018-06-05 DIAGNOSIS — O32.2XX0 TRANSVERSE LIE OF FETUS, SINGLE OR UNSPECIFIED FETUS: ICD-10-CM

## 2018-06-05 PROCEDURE — 87653 STREP B DNA AMP PROBE: CPT | Performed by: OBSTETRICS & GYNECOLOGY

## 2018-06-05 PROCEDURE — PNV: Performed by: OBSTETRICS & GYNECOLOGY

## 2018-06-05 NOTE — PROGRESS NOTES
GBS done: Yes  PCN allergy: No  Labor precautions given  [address] reviewed  Baby is transverse  Has  appt end of week

## 2018-06-08 ENCOUNTER — ROUTINE PRENATAL (OUTPATIENT)
Dept: PERINATAL CARE | Facility: CLINIC | Age: 28
End: 2018-06-08
Payer: COMMERCIAL

## 2018-06-08 VITALS
HEART RATE: 106 BPM | WEIGHT: 129.2 LBS | HEIGHT: 59 IN | BODY MASS INDEX: 26.04 KG/M2 | SYSTOLIC BLOOD PRESSURE: 102 MMHG | DIASTOLIC BLOOD PRESSURE: 67 MMHG

## 2018-06-08 DIAGNOSIS — Z3A.36 36 WEEKS GESTATION OF PREGNANCY: ICD-10-CM

## 2018-06-08 DIAGNOSIS — O40.3XX0 POLYHYDRAMNIOS IN THIRD TRIMESTER COMPLICATION, SINGLE OR UNSPECIFIED FETUS: Primary | ICD-10-CM

## 2018-06-08 LAB — GP B STREP DNA SPEC QL NAA+PROBE: NORMAL

## 2018-06-08 PROCEDURE — 76818 FETAL BIOPHYS PROFILE W/NST: CPT | Performed by: OBSTETRICS & GYNECOLOGY

## 2018-06-12 ENCOUNTER — ROUTINE PRENATAL (OUTPATIENT)
Dept: OBGYN CLINIC | Facility: CLINIC | Age: 28
End: 2018-06-12

## 2018-06-12 VITALS — WEIGHT: 128 LBS | SYSTOLIC BLOOD PRESSURE: 110 MMHG | DIASTOLIC BLOOD PRESSURE: 62 MMHG | BODY MASS INDEX: 25.85 KG/M2

## 2018-06-12 DIAGNOSIS — O40.3XX0 POLYHYDRAMNIOS IN THIRD TRIMESTER COMPLICATION, SINGLE OR UNSPECIFIED FETUS: Primary | ICD-10-CM

## 2018-06-12 DIAGNOSIS — Z3A.37 37 WEEKS GESTATION OF PREGNANCY: ICD-10-CM

## 2018-06-12 PROCEDURE — PNV: Performed by: OBSTETRICS & GYNECOLOGY

## 2018-06-12 NOTE — PROGRESS NOTES
59-year-old G1 at 37 2 weeks gestational age  Transverse position fetal head at maternal left backup  Continuing antepartum fetal surveillance for polyhydramnios  Reviewed term labor precautions    Reviewed with patient and  options for delivery if baby in transverse position at time of labor  Currently the baby is had a variable presentation  Discussed external cephalic version versus expected management versus scheduling a primary  section  Given the polyhydramnios I reviewed with the patient her  that even a successful there is a high likelihood that the baby will move back into transverse position  At this point the patient her  would like to plan a primary  section at 39 weeks  The risks and benefits were reviewed  We reviewed that even if she has a C-sections this time, in future pregnancies she may attempt a vaginal delivery as long as there is 18 months between deliveries      Currently a primary  section is scheduled at 18 at 12:30 p m  in the afternoon with Dr Marcelina Rocha

## 2018-06-15 ENCOUNTER — ROUTINE PRENATAL (OUTPATIENT)
Dept: PERINATAL CARE | Facility: CLINIC | Age: 28
End: 2018-06-15
Payer: COMMERCIAL

## 2018-06-15 ENCOUNTER — APPOINTMENT (OUTPATIENT)
Dept: PERINATAL CARE | Facility: CLINIC | Age: 28
End: 2018-06-15
Payer: COMMERCIAL

## 2018-06-15 VITALS
BODY MASS INDEX: 27.5 KG/M2 | SYSTOLIC BLOOD PRESSURE: 108 MMHG | WEIGHT: 131 LBS | DIASTOLIC BLOOD PRESSURE: 75 MMHG | HEIGHT: 58 IN | HEART RATE: 92 BPM

## 2018-06-15 DIAGNOSIS — O40.3XX0 POLYHYDRAMNIOS IN THIRD TRIMESTER COMPLICATION, SINGLE OR UNSPECIFIED FETUS: ICD-10-CM

## 2018-06-15 DIAGNOSIS — Z3A.37 37 WEEKS GESTATION OF PREGNANCY: ICD-10-CM

## 2018-06-15 PROCEDURE — 76818 FETAL BIOPHYS PROFILE W/NST: CPT | Performed by: OBSTETRICS & GYNECOLOGY

## 2018-06-21 ENCOUNTER — ROUTINE PRENATAL (OUTPATIENT)
Dept: OBGYN CLINIC | Facility: CLINIC | Age: 28
End: 2018-06-21

## 2018-06-21 VITALS — BODY MASS INDEX: 27.17 KG/M2 | DIASTOLIC BLOOD PRESSURE: 60 MMHG | WEIGHT: 130 LBS | SYSTOLIC BLOOD PRESSURE: 100 MMHG

## 2018-06-21 DIAGNOSIS — Z3A.38 38 WEEKS GESTATION OF PREGNANCY: Primary | ICD-10-CM

## 2018-06-21 PROCEDURE — PNV: Performed by: OBSTETRICS & GYNECOLOGY

## 2018-06-21 NOTE — PROGRESS NOTES
Stoney is a 28y o  year old  at 38w3d for routine prenatal visit  GBS neg  Polyhydramnios- getting APFS at [facility]  Has ultrasound scheduled for tomorrow  SVE: cl/th/h/post  Labor precautions given  [facility] reviewed  TAUS- transverse, fetal head on maternal left  Unstable lie- has c/s scheduled for  at 12:30  If vertex, would like vaginal delivery  hibiclens and showering instructions given today

## 2018-06-22 ENCOUNTER — ROUTINE PRENATAL (OUTPATIENT)
Dept: PERINATAL CARE | Facility: CLINIC | Age: 28
End: 2018-06-22
Payer: COMMERCIAL

## 2018-06-22 VITALS
WEIGHT: 131.4 LBS | BODY MASS INDEX: 27.58 KG/M2 | HEART RATE: 89 BPM | DIASTOLIC BLOOD PRESSURE: 68 MMHG | SYSTOLIC BLOOD PRESSURE: 101 MMHG | HEIGHT: 58 IN

## 2018-06-22 DIAGNOSIS — Z3A.38 38 WEEKS GESTATION OF PREGNANCY: ICD-10-CM

## 2018-06-22 DIAGNOSIS — Z36.89 ENCOUNTER FOR ULTRASOUND TO CHECK FETAL GROWTH: ICD-10-CM

## 2018-06-22 DIAGNOSIS — O40.3XX0 POLYHYDRAMNIOS IN THIRD TRIMESTER COMPLICATION, SINGLE OR UNSPECIFIED FETUS: ICD-10-CM

## 2018-06-22 PROCEDURE — 99213 OFFICE O/P EST LOW 20 MIN: CPT | Performed by: OBSTETRICS & GYNECOLOGY

## 2018-06-22 PROCEDURE — 76818 FETAL BIOPHYS PROFILE W/NST: CPT | Performed by: OBSTETRICS & GYNECOLOGY

## 2018-06-22 PROCEDURE — 76816 OB US FOLLOW-UP PER FETUS: CPT | Performed by: OBSTETRICS & GYNECOLOGY

## 2018-06-22 NOTE — PROGRESS NOTES
[address]: Ms Behzad Fontana was seen today at 38w5d for NST (found under the pregnancy episode) which I reviewed the RN assessment and agree, and fetal growth ultrasound (see ultrasound report under OB procedures tab)  See ultrasound report under "OB Procedures" tab  Please don't hesitate to contact our office with any concerns or questions    Marva Mckeon MD

## 2018-06-24 NOTE — H&P
H&P Exam - Obstetrics   Stoney Mccormick 28 y o  female MRN: [id number]  Unit/Bed#:  Encounter: [id number]    Assessment/Plan     Assessment:  IUP at 39 wks  Transverse lie of fetus  Polyhydramnios    Plan:  Admit to L&D  Labs/IVF  Anesthesia consult    History of Present Illness   Chief Complaint: Elective     HPI:  Helen Glass is a 28 y o   female with an ORTEGA of 2018, by Ultrasound at 38w6d weeks gestation who is being admitted for Elective   Her current obstetrical history is significant for polyhydramnios, transverse lie of fetus  Contractions: irregular  Leakage of fluid: None  Bleeding: None  Fetal movement: present  EFW 8#11 oz on   Transverse lie confirmed by bedside ultrasound  Pregnancy complications: polydramnios, transverse fetal lie  Review of Systems   Constitutional: Negative for chills and fever  Respiratory: Negative for shortness of breath  Cardiovascular: Negative for chest pain  Gastrointestinal: Negative for abdominal pain and nausea  Genitourinary: Negative for dysuria and vaginal bleeding  Neurological: Negative for light-headedness  Historical Information   OB History    Para Term  AB Living   1             SAB TAB Ectopic Multiple Live Births                  # Outcome Date GA Lbr Sundar/2nd Weight Sex Delivery Anes PTL Lv   1 Current                 Baby complications/comments: none    Past Medical History:   Diagnosis Date    Disease of thyroid gland     SUBCLINICAL HYPOTHYROIDISM     Female infertility     Vanishing twin syndrome      No past surgical history on file    Social History   History   Alcohol Use No     History   Drug Use No     History   Smoking Status    Never Smoker   Smokeless Tobacco    Never Used     Family History: non-contributory    Meds/Allergies   all medications and allergies reviewed       Allergies   Allergen Reactions    Clomiphene Other (See Comments)     CLOMID   Per pt if pt would move hand in front she will see images of her hand still moving       Objective   Vitals: Last menstrual period 09/18/2017, currently breastfeeding  There is no height or weight on file to calculate BMI  Invasive Devices          No matching active lines, drains, or airways          Physical Exam   Constitutional: She is oriented to person, place, and time  She appears well-developed  HENT:   Head: Normocephalic and atraumatic  Cardiovascular: Normal rate and regular rhythm  Pulmonary/Chest: No respiratory distress  Abdominal: Soft  There is no tenderness  Gravid     Musculoskeletal: She exhibits edema  Neurological: She is alert and oriented to person, place, and time  Skin: Skin is warm  Psychiatric: She has a normal mood and affect  Her behavior is normal           Prenatal Labs: I have personally reviewed pertinent reports    , Blood Type:   Lab Results   Component Value Date/Time    ABO Grouping A 12/16/2017 11:07 AM     , D (Rh type):   Lab Results   Component Value Date/Time    Rh Factor RH(D) POSITIVE 12/16/2017 11:07 AM     , Antibody Screen: No results found for: ANTIBODYSCR , HCT/HGB:   Lab Results   Component Value Date/Time    Hematocrit 34 4 (L) 04/11/2018 02:28 PM    Hematocrit 37 9 12/16/2017 11:07 AM    Hemoglobin 12 0 04/11/2018 02:28 PM    Hemoglobin 12 8 12/16/2017 11:07 AM      , MCV:   Lab Results   Component Value Date/Time    MCV 93 7 04/11/2018 02:28 PM    MCV 87 9 12/16/2017 11:07 AM      , Platelets:   Lab Results   Component Value Date/Time    Platelets 274 30/43/8994 11:07 AM    Platelet Count 242 79/81/7150 02:28 PM      , 1 hour Glucola:   Lab Results   Component Value Date/Time    Glucose 155 (H) 04/11/2018 02:28 PM   , Varicella:   Lab Results   Component Value Date/Time    Varicella IgG NON-IMMUNE (A) 03/17/2017 07:34 AM       , Rubella:   Lab Results   Component Value Date/Time    Rubella IgG Quant 155 4 03/17/2017 07:34 AM        , VDRL/RPR:   Lab Results   Component Value Date/Time    RPR SCREEN NON-REACTIVE 12/16/2017 11:07 AM      , Urine Culture/Screen: No results found for: URINECX    , Urine Drug Screen: No results found for: AMPHETUR, BARBTUR, BDZUR, THCUR, COCAINEUR, METHADONEUR, OPIATEUR, PCPUR, MTHAMUR, ECSTASYUR, TRICYCLICSUR, Hep B:   Lab Results   Component Value Date/Time    HEPATITIS B SURFACE ANTIGEN NON-REACTIVE 12/16/2017 11:07 AM     , HIV:   Lab Results   Component Value Date/Time    External HIV-1 Antibody neg 12/16/2017 11:26 AM     , Chlamydia: No results found for: EXTCHLAMYDIA  , Gonorrhea: No results found for: LABNGO, EXTGONSCRN  , Group B Strep:    Lab Results   Component Value Date/Time    Strep Grp B PCR Negative for Beta Hemolytic Strep Grp B by PCR 06/05/2018 01:32 PM          Imaging, EKG, Pathology, and Other Studies: I have personally reviewed pertinent reports

## 2018-06-25 ENCOUNTER — ANESTHESIA EVENT (INPATIENT)
Dept: LABOR AND DELIVERY | Facility: HOSPITAL | Age: 28
End: 2018-06-25
Payer: COMMERCIAL

## 2018-06-26 ENCOUNTER — HOSPITAL ENCOUNTER (INPATIENT)
Facility: HOSPITAL | Age: 28
LOS: 3 days | Discharge: HOME | End: 2018-06-29
Attending: OBSTETRICS & GYNECOLOGY | Admitting: OBSTETRICS & GYNECOLOGY
Payer: COMMERCIAL

## 2018-06-26 ENCOUNTER — ANESTHESIA (INPATIENT)
Dept: LABOR AND DELIVERY | Facility: HOSPITAL | Age: 28
End: 2018-06-26
Payer: COMMERCIAL

## 2018-06-26 DIAGNOSIS — Z3A.39 39 WEEKS GESTATION OF PREGNANCY: ICD-10-CM

## 2018-06-26 DIAGNOSIS — Z98.891 S/P CESAREAN SECTION: ICD-10-CM

## 2018-06-26 DIAGNOSIS — O40.3XX0 POLYHYDRAMNIOS IN THIRD TRIMESTER COMPLICATION, SINGLE OR UNSPECIFIED FETUS: Primary | ICD-10-CM

## 2018-06-26 DIAGNOSIS — O40.9XX0 POLYHYDRAMNIOS: ICD-10-CM

## 2018-06-26 DIAGNOSIS — O32.2XX0 TRANSVERSE LIE OF FETUS, SINGLE OR UNSPECIFIED FETUS: ICD-10-CM

## 2018-06-26 LAB
ABO GROUP BLD: NORMAL
BASE EXCESS BLDCOA CALC-SCNC: -1.4 MMOL/L (ref 3–11)
BASE EXCESS BLDCOV CALC-SCNC: -0.8 MMOL/L (ref 1–9)
BLD GP AB SCN SERPL QL: NEGATIVE
ERYTHROCYTE [DISTWIDTH] IN BLOOD BY AUTOMATED COUNT: 13.8 % (ref 11.6–15.1)
HCO3 BLDCOA-SCNC: 26 MMOL/L (ref 17.3–27.3)
HCO3 BLDCOV-SCNC: 25.1 MMOL/L (ref 12.2–28.6)
HCT VFR BLD AUTO: 41.4 % (ref 34.8–46.1)
HGB BLD-MCNC: 13.3 G/DL (ref 11.5–15.4)
MCH RBC QN AUTO: 31.3 PG (ref 26.8–34.3)
MCHC RBC AUTO-ENTMCNC: 32.1 G/DL (ref 31.4–37.4)
MCV RBC AUTO: 97 FL (ref 82–98)
O2 CT VFR BLDCOA CALC: 4.1 ML/DL
OXYHGB MFR BLDCOA: 19.1 %
OXYHGB MFR BLDCOV: 66.8 %
PCO2 BLDCOA: 54.2 MM[HG] (ref 30–60)
PCO2 BLDCOV: 45.7 MM HG (ref 27–43)
PH BLDCOA: 7.3 [PH] (ref 7.23–7.43)
PH BLDCOV: 7.36 [PH] (ref 7.19–7.49)
PLATELET # BLD AUTO: 240 THOUSANDS/UL (ref 149–390)
PMV BLD AUTO: 11 FL (ref 8.9–12.7)
PO2 BLDCOA: 12.9 MM HG (ref 5–25)
PO2 BLDCOV: 28.6 MM HG (ref 15–45)
RBC # BLD AUTO: 4.25 MILLION/UL (ref 3.81–5.12)
RH BLD: POSITIVE
SAO2 % BLDCOV: 14.9 ML/DL
SPECIMEN EXPIRATION DATE: NORMAL
WBC # BLD AUTO: 9.12 THOUSAND/UL (ref 4.31–10.16)

## 2018-06-26 PROCEDURE — 85027 COMPLETE CBC AUTOMATED: CPT | Performed by: OBSTETRICS & GYNECOLOGY

## 2018-06-26 PROCEDURE — 59510 CESAREAN DELIVERY: CPT | Performed by: OBSTETRICS & GYNECOLOGY

## 2018-06-26 PROCEDURE — 86850 RBC ANTIBODY SCREEN: CPT | Performed by: OBSTETRICS & GYNECOLOGY

## 2018-06-26 PROCEDURE — 86900 BLOOD TYPING SEROLOGIC ABO: CPT | Performed by: OBSTETRICS & GYNECOLOGY

## 2018-06-26 PROCEDURE — 86901 BLOOD TYPING SEROLOGIC RH(D): CPT | Performed by: OBSTETRICS & GYNECOLOGY

## 2018-06-26 PROCEDURE — 86592 SYPHILIS TEST NON-TREP QUAL: CPT | Performed by: OBSTETRICS & GYNECOLOGY

## 2018-06-26 PROCEDURE — 82805 BLOOD GASES W/O2 SATURATION: CPT | Performed by: OBSTETRICS & GYNECOLOGY

## 2018-06-26 RX ORDER — METOCLOPRAMIDE HYDROCHLORIDE 5 MG/ML
10 INJECTION INTRAMUSCULAR; INTRAVENOUS ONCE AS NEEDED
Status: DISCONTINUED | OUTPATIENT
Start: 2018-06-26 | End: 2018-06-29 | Stop reason: HOSPADM

## 2018-06-26 RX ORDER — KETOROLAC TROMETHAMINE 30 MG/ML
30 INJECTION, SOLUTION INTRAMUSCULAR; INTRAVENOUS EVERY 6 HOURS PRN
Status: DISPENSED | OUTPATIENT
Start: 2018-06-26 | End: 2018-06-27

## 2018-06-26 RX ORDER — IBUPROFEN 600 MG/1
600 TABLET ORAL EVERY 6 HOURS PRN
Status: DISCONTINUED | OUTPATIENT
Start: 2018-06-27 | End: 2018-06-29 | Stop reason: HOSPADM

## 2018-06-26 RX ORDER — OXYTOCIN 10 [USP'U]/ML
INJECTION, SOLUTION INTRAMUSCULAR; INTRAVENOUS AS NEEDED
Status: DISCONTINUED | OUTPATIENT
Start: 2018-06-26 | End: 2018-06-26 | Stop reason: SURG

## 2018-06-26 RX ORDER — MORPHINE SULFATE 0.5 MG/ML
INJECTION, SOLUTION EPIDURAL; INTRATHECAL; INTRAVENOUS AS NEEDED
Status: DISCONTINUED | OUTPATIENT
Start: 2018-06-26 | End: 2018-06-26 | Stop reason: SURG

## 2018-06-26 RX ORDER — DOCUSATE SODIUM 100 MG/1
100 CAPSULE, LIQUID FILLED ORAL 2 TIMES DAILY PRN
Status: DISCONTINUED | OUTPATIENT
Start: 2018-06-26 | End: 2018-06-29 | Stop reason: HOSPADM

## 2018-06-26 RX ORDER — DEXAMETHASONE SODIUM PHOSPHATE 4 MG/ML
8 INJECTION, SOLUTION INTRA-ARTICULAR; INTRALESIONAL; INTRAMUSCULAR; INTRAVENOUS; SOFT TISSUE ONCE AS NEEDED
Status: ACTIVE | OUTPATIENT
Start: 2018-06-26 | End: 2018-06-27

## 2018-06-26 RX ORDER — CALCIUM CARBONATE 200(500)MG
1000 TABLET,CHEWABLE ORAL DAILY PRN
Status: DISCONTINUED | OUTPATIENT
Start: 2018-06-26 | End: 2018-06-29 | Stop reason: HOSPADM

## 2018-06-26 RX ORDER — MAGNESIUM HYDROXIDE/ALUMINUM HYDROXICE/SIMETHICONE 120; 1200; 1200 MG/30ML; MG/30ML; MG/30ML
15 SUSPENSION ORAL EVERY 6 HOURS PRN
Status: DISCONTINUED | OUTPATIENT
Start: 2018-06-26 | End: 2018-06-29 | Stop reason: HOSPADM

## 2018-06-26 RX ORDER — NALBUPHINE HCL 10 MG/ML
5 AMPUL (ML) INJECTION
Status: DISPENSED | OUTPATIENT
Start: 2018-06-26 | End: 2018-06-27

## 2018-06-26 RX ORDER — SODIUM CHLORIDE, SODIUM LACTATE, POTASSIUM CHLORIDE, CALCIUM CHLORIDE 600; 310; 30; 20 MG/100ML; MG/100ML; MG/100ML; MG/100ML
125 INJECTION, SOLUTION INTRAVENOUS CONTINUOUS
Status: DISCONTINUED | OUTPATIENT
Start: 2018-06-26 | End: 2018-06-29 | Stop reason: HOSPADM

## 2018-06-26 RX ORDER — SIMETHICONE 80 MG
80 TABLET,CHEWABLE ORAL 4 TIMES DAILY PRN
Status: DISCONTINUED | OUTPATIENT
Start: 2018-06-26 | End: 2018-06-29 | Stop reason: HOSPADM

## 2018-06-26 RX ORDER — DIPHENHYDRAMINE HYDROCHLORIDE 50 MG/ML
25 INJECTION INTRAMUSCULAR; INTRAVENOUS EVERY 6 HOURS PRN
Status: ACTIVE | OUTPATIENT
Start: 2018-06-26 | End: 2018-06-27

## 2018-06-26 RX ORDER — BUPIVACAINE HYDROCHLORIDE 7.5 MG/ML
INJECTION, SOLUTION INTRASPINAL AS NEEDED
Status: DISCONTINUED | OUTPATIENT
Start: 2018-06-26 | End: 2018-06-26 | Stop reason: SURG

## 2018-06-26 RX ORDER — NALOXONE HYDROCHLORIDE 0.4 MG/ML
0.1 INJECTION, SOLUTION INTRAMUSCULAR; INTRAVENOUS; SUBCUTANEOUS
Status: ACTIVE | OUTPATIENT
Start: 2018-06-26 | End: 2018-06-27

## 2018-06-26 RX ORDER — ONDANSETRON 2 MG/ML
INJECTION INTRAMUSCULAR; INTRAVENOUS AS NEEDED
Status: DISCONTINUED | OUTPATIENT
Start: 2018-06-26 | End: 2018-06-26 | Stop reason: SURG

## 2018-06-26 RX ORDER — DIAPER,BRIEF,INFANT-TODD,DISP
1 EACH MISCELLANEOUS DAILY PRN
Status: DISCONTINUED | OUTPATIENT
Start: 2018-06-26 | End: 2018-06-29 | Stop reason: HOSPADM

## 2018-06-26 RX ORDER — METOCLOPRAMIDE HYDROCHLORIDE 5 MG/ML
5 INJECTION INTRAMUSCULAR; INTRAVENOUS EVERY 6 HOURS PRN
Status: ACTIVE | OUTPATIENT
Start: 2018-06-26 | End: 2018-06-27

## 2018-06-26 RX ORDER — ONDANSETRON 2 MG/ML
4 INJECTION INTRAMUSCULAR; INTRAVENOUS EVERY 4 HOURS PRN
Status: DISPENSED | OUTPATIENT
Start: 2018-06-26 | End: 2018-06-27

## 2018-06-26 RX ORDER — ONDANSETRON 2 MG/ML
4 INJECTION INTRAMUSCULAR; INTRAVENOUS EVERY 8 HOURS PRN
Status: DISCONTINUED | OUTPATIENT
Start: 2018-06-27 | End: 2018-06-29 | Stop reason: HOSPADM

## 2018-06-26 RX ORDER — LEVOTHYROXINE SODIUM 0.07 MG/1
75 TABLET ORAL
Status: DISCONTINUED | OUTPATIENT
Start: 2018-06-27 | End: 2018-06-29 | Stop reason: HOSPADM

## 2018-06-26 RX ORDER — DEXAMETHASONE SODIUM PHOSPHATE 4 MG/ML
INJECTION, SOLUTION INTRA-ARTICULAR; INTRALESIONAL; INTRAMUSCULAR; INTRAVENOUS; SOFT TISSUE AS NEEDED
Status: DISCONTINUED | OUTPATIENT
Start: 2018-06-26 | End: 2018-06-26 | Stop reason: SURG

## 2018-06-26 RX ORDER — TRISODIUM CITRATE DIHYDRATE AND CITRIC ACID MONOHYDRATE 500; 334 MG/5ML; MG/5ML
30 SOLUTION ORAL 4 TIMES DAILY PRN
Status: DISCONTINUED | OUTPATIENT
Start: 2018-06-26 | End: 2018-06-29 | Stop reason: HOSPADM

## 2018-06-26 RX ORDER — FERROUS SULFATE 325(65) MG
325 TABLET ORAL
Status: DISCONTINUED | OUTPATIENT
Start: 2018-06-27 | End: 2018-06-29 | Stop reason: HOSPADM

## 2018-06-26 RX ORDER — OXYTOCIN/RINGER'S LACTATE 30/500 ML
62.5 PLASTIC BAG, INJECTION (ML) INTRAVENOUS ONCE
Status: COMPLETED | OUTPATIENT
Start: 2018-06-26 | End: 2018-06-27

## 2018-06-26 RX ORDER — OXYCODONE HYDROCHLORIDE AND ACETAMINOPHEN 5; 325 MG/1; MG/1
2 TABLET ORAL EVERY 4 HOURS PRN
Status: DISCONTINUED | OUTPATIENT
Start: 2018-06-27 | End: 2018-06-28

## 2018-06-26 RX ORDER — OMEGA-3S/DHA/EPA/FISH OIL/D3 300MG-1000
400 CAPSULE ORAL DAILY
Status: DISCONTINUED | OUTPATIENT
Start: 2018-06-27 | End: 2018-06-29 | Stop reason: HOSPADM

## 2018-06-26 RX ORDER — OXYCODONE HYDROCHLORIDE AND ACETAMINOPHEN 5; 325 MG/1; MG/1
1 TABLET ORAL EVERY 4 HOURS PRN
Status: DISCONTINUED | OUTPATIENT
Start: 2018-06-27 | End: 2018-06-28

## 2018-06-26 RX ORDER — SENNOSIDES 8.6 MG
1 TABLET ORAL
Status: DISCONTINUED | OUTPATIENT
Start: 2018-06-26 | End: 2018-06-29 | Stop reason: HOSPADM

## 2018-06-26 RX ORDER — BISACODYL 10 MG
10 SUPPOSITORY, RECTAL RECTAL DAILY PRN
Status: DISCONTINUED | OUTPATIENT
Start: 2018-06-26 | End: 2018-06-29 | Stop reason: HOSPADM

## 2018-06-26 RX ORDER — EPHEDRINE SULFATE 50 MG/ML
INJECTION, SOLUTION INTRAVENOUS AS NEEDED
Status: DISCONTINUED | OUTPATIENT
Start: 2018-06-26 | End: 2018-06-26 | Stop reason: SURG

## 2018-06-26 RX ORDER — ACETAMINOPHEN 325 MG/1
650 TABLET ORAL EVERY 6 HOURS PRN
Status: DISCONTINUED | OUTPATIENT
Start: 2018-06-26 | End: 2018-06-29 | Stop reason: HOSPADM

## 2018-06-26 RX ORDER — DIPHENHYDRAMINE HCL 25 MG
25 TABLET ORAL EVERY 6 HOURS PRN
Status: DISCONTINUED | OUTPATIENT
Start: 2018-06-26 | End: 2018-06-29 | Stop reason: HOSPADM

## 2018-06-26 RX ORDER — ONDANSETRON 2 MG/ML
4 INJECTION INTRAMUSCULAR; INTRAVENOUS ONCE AS NEEDED
Status: DISCONTINUED | OUTPATIENT
Start: 2018-06-26 | End: 2018-06-29 | Stop reason: HOSPADM

## 2018-06-26 RX ADMIN — KETOROLAC TROMETHAMINE 30 MG: 30 INJECTION, SOLUTION INTRAMUSCULAR at 17:31

## 2018-06-26 RX ADMIN — OXYTOCIN 20 UNITS: 10 INJECTION, SOLUTION INTRAMUSCULAR; INTRAVENOUS at 13:19

## 2018-06-26 RX ADMIN — SODIUM CHLORIDE, SODIUM LACTATE, POTASSIUM CHLORIDE, AND CALCIUM CHLORIDE: .6; .31; .03; .02 INJECTION, SOLUTION INTRAVENOUS at 12:29

## 2018-06-26 RX ADMIN — ONDANSETRON 4 MG: 2 INJECTION INTRAMUSCULAR; INTRAVENOUS at 12:51

## 2018-06-26 RX ADMIN — EPHEDRINE SULFATE 10 MG: 50 INJECTION, SOLUTION INTRAMUSCULAR; INTRAVENOUS; SUBCUTANEOUS at 12:44

## 2018-06-26 RX ADMIN — Medication 62.5 MILLI-UNITS/MIN: at 16:20

## 2018-06-26 RX ADMIN — SODIUM CHLORIDE, SODIUM LACTATE, POTASSIUM CHLORIDE, AND CALCIUM CHLORIDE 125 ML/HR: .6; .31; .03; .02 INJECTION, SOLUTION INTRAVENOUS at 16:11

## 2018-06-26 RX ADMIN — PHENYLEPHRINE HYDROCHLORIDE 50 MCG/MIN: 10 INJECTION INTRAVENOUS at 12:39

## 2018-06-26 RX ADMIN — MORPHINE SULFATE 0.2 MG: 0.5 INJECTION, SOLUTION EPIDURAL; INTRATHECAL; INTRAVENOUS at 12:37

## 2018-06-26 RX ADMIN — DEXAMETHASONE SODIUM PHOSPHATE 4 MG: 4 INJECTION, SOLUTION INTRAMUSCULAR; INTRAVENOUS at 12:51

## 2018-06-26 RX ADMIN — SODIUM CHLORIDE, SODIUM LACTATE, POTASSIUM CHLORIDE, AND CALCIUM CHLORIDE 125 ML/HR: .6; .31; .03; .02 INJECTION, SOLUTION INTRAVENOUS at 12:09

## 2018-06-26 RX ADMIN — BUPIVACAINE HYDROCHLORIDE IN DEXTROSE 1.5 ML: 7.5 INJECTION, SOLUTION SUBARACHNOID at 12:37

## 2018-06-26 RX ADMIN — OXYTOCIN 15 UNITS: 10 INJECTION, SOLUTION INTRAMUSCULAR; INTRAVENOUS at 13:02

## 2018-06-26 RX ADMIN — SODIUM CHLORIDE, SODIUM LACTATE, POTASSIUM CHLORIDE, AND CALCIUM CHLORIDE 1000 ML: .6; .31; .03; .02 INJECTION, SOLUTION INTRAVENOUS at 11:04

## 2018-06-26 RX ADMIN — EPHEDRINE SULFATE 10 MG: 50 INJECTION, SOLUTION INTRAMUSCULAR; INTRAVENOUS; SUBCUTANEOUS at 12:51

## 2018-06-26 RX ADMIN — CEFAZOLIN SODIUM 1000 MG: 1 SOLUTION INTRAVENOUS at 12:45

## 2018-06-26 RX ADMIN — ONDANSETRON 4 MG: 2 INJECTION, SOLUTION INTRAMUSCULAR; INTRAVENOUS at 18:08

## 2018-06-26 NOTE — DISCHARGE INSTRUCTIONS
Section   WHAT YOU SHOULD KNOW:   A  delivery, or , is abdominal surgery to deliver your baby  There are many reasons you may need a   · A  may be scheduled before labor if you had a  with your last baby  It may be scheduled if your baby is not positioned normally, or you are pregnant with more than 1 baby  · Your caregiver may perform an emergency  during labor to prevent life-threatening complications for you or your baby  A  may be done if your cervix does not dilate after several hours of active labor  · Other reasons for a  include maternal infections and problems with the placenta  AFTER YOU LEAVE:   Medicines:   · Prescription pain medicine  may be given  Ask how to take this medicine safely  · Acetaminophen  decreases pain and fever  It is available without a doctor's order  Ask how much to take and how often to take it  Follow directions  Acetaminophen can cause liver damage if not taken correctly  · NSAIDs  help decrease swelling and pain or fever  This medicine is available with or without a doctor's order  NSAIDs can cause stomach bleeding or kidney problems in certain people  If you take blood thinner medicine, always ask your obstetrician if NSAIDs are safe for you  Always read the medicine label and follow directions  · Take your medicine as directed  Contact your obstetrician (OB) if you think your medicine is not helping or if you have side effects  Tell him if you are allergic to any medicine  Keep a list of the medicines, vitamins, and herbs you take  Include the amounts, and when and why you take them  Bring the list or the pill bottles to follow-up visits  Carry your medicine list with you in case of an emergency  Follow up with your OB as directed: You may need to return to have your stitches or staples removed   Write down your questions so you remember to ask them during your visits  Wound care:  Carefully wash your wound with soap and water every day  Keep your wound clean and dry  Wear loose, comfortable clothes that do not rub against your wound  Ask your OB about bathing and showering  Drink plenty of liquids: You can lower your risk for a blood clot if you drink plenty of liquids  Ask how much liquid to drink each day and which liquids are best for you  Limit activity until you have fully recovered from surgery:   · Ask when it is safe for you to drive, walk up stairs, lift heavy objects, and have sex  · Ask when it is okay to exercise, and what types of exercise to do  Start slowly and do more as you get stronger  Contact your OB if:   · You have heavy vaginal bleeding that fills 1 or more sanitary pads in 1 hour  · You have a fever  · Your incision is swollen, red, or draining pus  · You have questions or concerns about yourself or your baby  Seek care immediately or call 911 if:   · Blood soaks through your bandage  · Your stitches come apart  · You feel lightheaded, short of breath, and have chest pain  · You cough up blood  · Your arm or leg feels warm, tender, and painful  It may look swollen and red  © 2014 [address] is for End User's use only and may not be sold, redistributed or otherwise used for commercial purposes  All illustrations and images included in CareNotes® are the copyrighted property of A D A M , Inc  or Clarence Shetty  The above information is an  only  It is not intended as medical advice for individual conditions or treatments  Talk to your doctor, nurse or pharmacist before following any medical regimen to see if it is safe and effective for you

## 2018-06-26 NOTE — OP NOTE
Section Operative Report - OB/GYN   Stoney Mccormick 28 y o  female MRN: [id number]  Unit/Bed#: LD PACU- Encounter: [id number]    Indications: transverse lie    Pre-operative Diagnosis:   1  39 week 2 day pregnancy  2  Transverse lie  3  polyhydramnios    Post-operative Diagnosis: same, delivered    Surgeon: Maria Luisa Bower DO    Assistant(s): Mitch Stringer MD    Findings:  1  Delivery of viable female on 18 at 1302, weight 8lbs 1oz;  Apgar scores of 9 at one minute and 9 at five minutes  2  Normal appearing placenta with centrally-inserted 3 vessel cord  3  Clear amniotic fluid  4  Grossly normal uterus, tubes, and ovaries    Specimens:   1  Arterial and venous cord gases  2  Cord blood  3  Segment of umbilical cord  4  Placenta to storage     Estimated Blood Loss:  700 mL           Total IV Fluids: 2000 mL    Drains: Fox catheter           Complications:  None; patient tolerated the procedure well  Disposition: PACU            Condition: stable    Procedure Details   Decision was made to proceed with  section due to transverse lie  Patient was made aware of these findings and the proposed plan  Risks, benefits, possible complications, alternate treatment options, and expected outcomes were discussed with the patient  The patient agreed with the proposed plan and gave informed consent  The patient was taken to the operating room where she was properly identified to the OR staff and attending physician  She received spinal anesthesia preoperatively  Fetal heart tones were appreciated and found to be appropriate  The vagina and perineum were prepped with chlorohexidine prep  A Fox catheter was aseptically inserted and SCDs were placed  The abdomen was prepped with Chloraprep and following appropriate drying time, the patient was draped in the usual sterile manner for a Pfannenstiel incision  The patient had received Ancef 2g IV pre-operatively for prophylaxis    A Time Out was held and the above information confirmed  The patient was identified as Stoney Mccormick and the procedure verified as  Delivery  A Pfannenstiel incision was made and carried down through the underlying subcutaneous tissue to the fascia using a scalpel  Rectus fascia was then incised in the midline and extended laterally using Weiss scissors  The superior edge of the fascia was grasped with Kocher clamps, tented upward, and the underlying muscle was bluntly dissected off  The inferior edge was grasped with Kocher clamps and cleared in similar fashion  All anatomic layers were well-demarcated  The rectus muscles were  and the peritoneum was identified and subsequently entered and extended longitudinally with blunt dissection  The vesicouterine peritoneum was identified and a bladder flap was created using Metzenbaum scissors  The bladder blade was inserted  A low transverse uterine incision was made with the scalpel and extended laterally with blunt dissection  The amnion was entered sharply  Surgeons hand was inserted through the hysterotomy and the fetal head was palpated, elevated, and delivered through the uterine incision with the assistance of fundal pressure  Baby had spontaneous cry with good color and tone  The umbilical cord was clamped and cut  The infant was handed off to the  providers  Arterial and venous cord gases, cord blood, and a segment of umbilical cord were obtained for evaluation and promptly sent to the lab  The placenta delivered spontaneously with uterine fundal massage and was noted to have a centrally inserted 3 vessel cord  This was also sent to the lab for placental pathology  The uterus was exteriorized and a moist lap sponge was used to clear the cavity of clots and products of conception  The uterine incision was closed with a running locked suture of 0 monocryl   A second layer of the same suture was used to imbricate the first   Good hemostasis was confirmed upon uterine closure  Warmed normal saline solution was used to irrigate the posterior culdesac and the uterus was returned to the abdomen  The paracolic gutters were inspected and cleared of all clots and debris with irrigation and moist lap sponges  The peritoneum was closed with a running suture of 3-0 plain gut  The fascia was closed with a running suture of 0 Vicryl  Subcutaneous tissues were closed with 2-0 plain gut suture  The skin was closed with 4-0 Monocryl in a subcuticular fashion  Histoacryl was applied and an abdominal binder was then placed  At the conclusion of the procedure, all needle, sponge, and instrument counts were noted to be correct x2  The patient tolerated the procedure well and was transferred to her the recovery room in stable condition  Dr Blaise Bennett was present and participated in all key portions of the case        Jeanna Rasheed MD  OB/GYN PGY-2  6/26/2018 3:38 PM

## 2018-06-26 NOTE — ANESTHESIA PREPROCEDURE EVALUATION
Review of Systems/Medical History  Patient summary reviewed  Chart reviewed  No history of anesthetic complications     Cardiovascular   Pulmonary  Negative pulmonary ROS        GI/Hepatic            Endo/Other  History of thyroid disease , hypothyroidism,      GYN       Hematology   Musculoskeletal       Neurology   Psychology           Physical Exam    Airway    Mallampati score: II  TM Distance: >3 FB  Neck ROM: full     Dental       Cardiovascular      Pulmonary      Other Findings        Anesthesia Plan  ASA Score- 2     Anesthesia Type- spinal with ASA Monitors  Additional Monitors:   Airway Plan:         Plan Factors-    Induction-     Postoperative Plan-     Informed Consent- Anesthetic plan and risks discussed with patient  I personally reviewed this patient with the CRNA  Discussed and agreed on the Anesthesia Plan with the CRNA  Annie Gallego

## 2018-06-26 NOTE — DISCHARGE SUMMARY
CS Discharge Summary - eJnae Cutler 28 y o  female MRN: [id number]    Unit/Bed#: LD PACU- Encounter: [id number]    Admission Date: 2018     Discharge Date: 18    Admitting Diagnosis: 44 week gestation, transverse lie    Discharge Diagnosis: same    Procedures: primary  section, low transverse incision    Attending: [facility] Course:     Jenae Cutler is a 28 y o   who was admitted at 39 wks for transverse lie  She underwent an uncomplicated  section   was transferred to  nursery  Patient tolerated the procedure well and was transferred to recovery in stable condition  Her post-operative course was uncomplicated  Preoperative hemoglobin was 13 3, postoperative was 10 4  Her postoperative pain was well controlled with oral analgesics  On day of discharge, she was ambulating and able to reasonably perform all ADLs  She was voiding and had appropriate bowel function  Pain was well controlled  She was discharged home on post-operative day #3 without complications  Patient was instructed to follow up with her OB as an outpatient and was given appropriate warnings to call provider if she develops signs of infection or uncontrolled pain  Complications: None    Condition at discharge: good     Discharge instructions/Information to patient and family:   See after visit summary for information provided to patient and family  Provisions for Follow-Up Care:  See after visit summary for information related to follow-up care and any pertinent home health orders  Disposition: Home    Planned Readmission: No    Discharge Medications: For a complete list of the patient's medications, please refer to her med rec      Luis Armando Persaud MD

## 2018-06-26 NOTE — ANESTHESIA PROCEDURE NOTES
Spinal Block    Patient location during procedure: OR  Start time: 6/26/2018 12:36 PM  Reason for block: procedure for pain and at surgeon's request  Staffing  Anesthesiologist: Johny Joshi  Performed: anesthesiologist   Preanesthetic Checklist  Completed: patient identified, site marked, surgical consent, pre-op evaluation, timeout performed, IV checked, risks and benefits discussed and monitors and equipment checked  Spinal Block  Patient position: sitting  Prep: ChloraPrep  Patient monitoring: cardiac monitor and frequent blood pressure checks  Approach: midline  Location: L3-4  Injection technique: single-shot  Needle  Needle type: pencil-tip   Needle gauge: 25 G  Needle length: 10 cm  Assessment  Sensory level: T4  Injection Assessment:  negative aspiration for heme, no paresthesia on injection and positive aspiration for clear CSF    Post-procedure:  adhesive bandage applied, pressure dressing applied, secured with tape, site cleaned and sterile dressing applied  Additional Notes  One attempt, easy

## 2018-06-27 LAB
BASOPHILS # BLD AUTO: 0.03 THOUSANDS/ΜL (ref 0–0.1)
BASOPHILS NFR BLD AUTO: 0 % (ref 0–1)
EOSINOPHIL # BLD AUTO: 0.05 THOUSAND/ΜL (ref 0–0.61)
EOSINOPHIL NFR BLD AUTO: 0 % (ref 0–6)
ERYTHROCYTE [DISTWIDTH] IN BLOOD BY AUTOMATED COUNT: 13.6 % (ref 11.6–15.1)
HCT VFR BLD AUTO: 31.8 % (ref 34.8–46.1)
HGB BLD-MCNC: 10.4 G/DL (ref 11.5–15.4)
IMM GRANULOCYTES # BLD AUTO: 0.08 THOUSAND/UL (ref 0–0.2)
IMM GRANULOCYTES NFR BLD AUTO: 1 % (ref 0–2)
LYMPHOCYTES # BLD AUTO: 3.28 THOUSANDS/ΜL (ref 0.6–4.47)
LYMPHOCYTES NFR BLD AUTO: 23 % (ref 14–44)
MCH RBC QN AUTO: 32 PG (ref 26.8–34.3)
MCHC RBC AUTO-ENTMCNC: 32.7 G/DL (ref 31.4–37.4)
MCV RBC AUTO: 98 FL (ref 82–98)
MONOCYTES # BLD AUTO: 1.13 THOUSAND/ΜL (ref 0.17–1.22)
MONOCYTES NFR BLD AUTO: 8 % (ref 4–12)
NEUTROPHILS # BLD AUTO: 9.77 THOUSANDS/ΜL (ref 1.85–7.62)
NEUTS SEG NFR BLD AUTO: 68 % (ref 43–75)
NRBC BLD AUTO-RTO: 0 /100 WBCS
PLATELET # BLD AUTO: 197 THOUSANDS/UL (ref 149–390)
PMV BLD AUTO: 11.7 FL (ref 8.9–12.7)
RBC # BLD AUTO: 3.25 MILLION/UL (ref 3.81–5.12)
RPR SER QL: NORMAL
WBC # BLD AUTO: 14.34 THOUSAND/UL (ref 4.31–10.16)

## 2018-06-27 PROCEDURE — 99024 POSTOP FOLLOW-UP VISIT: CPT | Performed by: OBSTETRICS & GYNECOLOGY

## 2018-06-27 PROCEDURE — 85025 COMPLETE CBC W/AUTO DIFF WBC: CPT | Performed by: OBSTETRICS & GYNECOLOGY

## 2018-06-27 RX ADMIN — SODIUM CHLORIDE, SODIUM LACTATE, POTASSIUM CHLORIDE, AND CALCIUM CHLORIDE 125 ML/HR: .6; .31; .03; .02 INJECTION, SOLUTION INTRAVENOUS at 01:08

## 2018-06-27 RX ADMIN — KETOROLAC TROMETHAMINE 30 MG: 30 INJECTION, SOLUTION INTRAMUSCULAR at 09:38

## 2018-06-27 RX ADMIN — DOCUSATE SODIUM 100 MG: 100 CAPSULE, LIQUID FILLED ORAL at 21:39

## 2018-06-27 RX ADMIN — Medication 325 MG: at 08:17

## 2018-06-27 RX ADMIN — OXYCODONE HYDROCHLORIDE AND ACETAMINOPHEN 1 TABLET: 5; 325 TABLET ORAL at 13:48

## 2018-06-27 RX ADMIN — OXYCODONE HYDROCHLORIDE AND ACETAMINOPHEN 1 TABLET: 5; 325 TABLET ORAL at 21:39

## 2018-06-27 RX ADMIN — Medication 1 TABLET: at 08:17

## 2018-06-27 RX ADMIN — DOCUSATE SODIUM 100 MG: 100 CAPSULE, LIQUID FILLED ORAL at 08:17

## 2018-06-27 NOTE — LACTATION NOTE
This note was copied from a baby's chart  CONSULT - LACTATION  Baby Girl  (Nimmy) Анна Heller 1 days female MRN: [id number]    [facility] Room / Bed: (N)/(N) Encounter: [id number]    Maternal Information     MOTHER:  Stoney Mccormick  Maternal Age: 28 y o    OB History: #: 1, Date: 18, Sex: Female, Weight: 3670 g (8 lb 1 5 oz), GA: 39w2d, Delivery: , Low Transverse, Apgar1: 9, Apgar5: 9, Living: Living, Birth Comments: None   Previouse breast reduction surgery?no    Lactation history:   Has patient previously breast fed: No   How long had patient previously breast fed:     Previous breast feeding complications:       Past Surgical History:   Procedure Laterality Date    DC  DELIVERY ONLY N/A 2018    Procedure:  SECTION (); Surgeon: Gatito Kaur DO;  Location: [facility] ;  Service: Obstetrics       Birth information:  YOB: 2019   Time of birth: 1:02 PM   Sex: female   Delivery type: , Low Transverse   Birth Weight: 3670 g (8 lb 1 5 oz)   Percent of Weight Change: -3%     Gestational Age: 44w2d   [unfilled]    Assessment     Breast and nipple assessment: normal assessment     Assessment: normal assessment    Feeding assessment: feeding well  LATCH:  Latch: Grasps breast, tongue down, lips flanged, rhythmic sucking   Audible Swallowing: Spontaneous and intermittent (24 hours old)   Type of Nipple: Everted (After stimulation)   Comfort (Breast/Nipple): Soft/non-tender   Hold (Positioning): Full assist, staff holds infant at breast   LATCH Score: 8          Feeding recommendations:  breast feed on demand       Met with mother  Provided mother with Ready, Set, Baby booklet  Discussed Skin to Skin contact an benefits to mom and baby  Talked about the delay of the first bath until baby has adjusted  Spoke about the benefits of rooming in   Feeding on cue and what that means for recognizing infant's hunger  Avoidance of pacifiers for the first month discussed  Talked about exclusive breastfeeding for the first 6 months  Positioning and latch reviewed as well as showing images of other feeding positions  Discussed the properties of a good latch in any position  Reviewed hand/manual expression  Discussed s/s that baby is getting enough milk and some s/s that breastfeeding dyad may need further help  Gave information on common concerns, what to expect the first few weeks after delivery, preparing for other caregivers, and how partners can help  Resources for support also provided  Discussed 2nd night syndrome and ways to calm infant  Hand out given  Information on hand expression given  Discussed benefits of knowing how to manually express breast including stimulating milk supply, softening nipple for latch and evacuating breast in the event of engorgement  Called for assistance with feeding at this time  Infant on BS protocol  Infant is sleepy but does arouse with proper positioning, stim to stuck, and hand expression  Mom needing much assistance with holding infant at this time  baby placed in football hold to allow for better next support  Enc to call for additional help as needed       Adonis Conklin RN 6/27/2018 12:06 PM

## 2018-06-27 NOTE — PROGRESS NOTES
Postpartum Progress Note - OB/GYN   Stoney Mccormick 28 y o  female MRN: [id number]  Unit/Bed#: -01 Encounter: [id number]    ASSESSMENT:  Stoney Mccormick 28 y o  Marj Riggs s/p Primary low transverse  section post-operative day 1  Pt is well appearing and with no current complaints  PLAN:  1) Continue routine postpartum care  2) Encourage ambulation  3) Pain control as needed  4) Advance diet as tolerated  5) Dispo: stable and comfortable    Subjective/Objective     SUBJECTIVE:  Pain: no  Tolerating Oral Intake: yes   Voiding: yes  Flatus: yes  Bowel Movement: no  Ambulating: yes  Breastfeeding: yes  Chest Pain: no  Shortness of Breath: no  Leg Pain/Discomfort: no  Lochia: minimal    OBJECTIVE:     Vitals: Blood pressure (!) 81/46, pulse 79, temperature 98 2 °F (36 8 °C), temperature source Oral, resp  rate 18, height 4' 10" (1 473 m), weight 59 kg (130 lb), last menstrual period 2017, SpO2 99 %, currently breastfeeding       General: appears well, alert and oriented x 3, pleasant and cooperative  Cardiovascular: RRR, normal S1/S2, no GRM  Lungs:  clear to auscultation bilaterally; no WRR, non-labored breathing   Abdomen: normal bowel sounds, soft, no tenderness, no distention  : Uterine fundus firm: -2 cm below the umbilicus  Incision: Clean, dry, and intact, closed with running absorbable suture, no erythema/fluctueance appreciated  Lower Extremities: Non-tender, peripheral pulses normal; no clubbing, cyanosis, or edema    Labs:   Lab Results   Component Value Date    WBC 14 34 (H) 2018    HGB 10 4 (L) 2018    HCT 31 8 (L) 2018    MCV 98 2018     2018       Medications:   Current Facility-Administered Medications   Medication Dose Route Frequency    acetaminophen (TYLENOL) tablet 650 mg  650 mg Oral Q6H PRN    aluminum-magnesium hydroxide-simethicone (MYLANTA) 200-200-20 mg/5 mL oral suspension 15 mL  15 mL Oral Q6H PRN    benzocaine-menthol-lanolin-aloe (DERMOPLAST) 20-0 5 % topical spray 1 application  1 application Topical O1T PRN    bisacodyl (DULCOLAX) rectal suppository 10 mg  10 mg Rectal Daily PRN    calcium carbonate (TUMS) chewable tablet 1,000 mg  1,000 mg Oral Daily PRN    cholecalciferol (VITAMIN D3) tablet 400 Units  400 Units Oral Daily    dexamethasone (DECADRON) injection 8 mg  8 mg Intravenous Once PRN    diphenhydrAMINE (BENADRYL) injection 25 mg  25 mg Intravenous Q6H PRN    diphenhydrAMINE (BENADRYL) tablet 25 mg  25 mg Oral Q6H PRN    docusate sodium (COLACE) capsule 100 mg  100 mg Oral BID PRN    ferrous sulfate tablet 325 mg  325 mg Oral Daily With Breakfast    hydrocortisone 1 % cream 1 application  1 application Topical Daily PRN    HYDROmorphone (DILAUDID) injection 0 5 mg  0 5 mg Intravenous Q5 Min PRN    HYDROmorphone (DILAUDID) injection 0 5 mg  0 5 mg Intravenous Q1H PRN    ibuprofen (MOTRIN) tablet 600 mg  600 mg Oral Q6H PRN    ketorolac (TORADOL) injection 30 mg  30 mg Intravenous Q6H PRN    lactated ringers infusion  125 mL/hr Intravenous Continuous    lactated ringers infusion  125 mL/hr Intravenous Continuous    levothyroxine tablet 75 mcg  75 mcg Oral Early Morning    metoclopramide (REGLAN) injection 10 mg  10 mg Intravenous Once PRN    metoclopramide (REGLAN) injection 5 mg  5 mg Intravenous Q6H PRN    nalbuphine (NUBAIN) injection 5 mg  5 mg Intravenous Q3H PRN    naloxone (NARCAN) injection 0 1 mg  0 1 mg Intravenous Q3 min PRN    ondansetron (ZOFRAN) injection 4 mg  4 mg Intravenous Once PRN    ondansetron (ZOFRAN) injection 4 mg  4 mg Intravenous Q4H PRN    ondansetron (ZOFRAN) injection 4 mg  4 mg Intravenous Q8H PRN    oxyCODONE-acetaminophen (PERCOCET) 5-325 mg per tablet 1 tablet  1 tablet Oral Q4H PRN    oxyCODONE-acetaminophen (PERCOCET) 5-325 mg per tablet 2 tablet  2 tablet Oral Q4H PRN    prenatal multivitamin tablet 1 tablet  1 tablet Oral Daily    senna (SENOKOT) tablet 8 6 mg  1 tablet Oral HS PRN    simethicone (MYLICON) chewable tablet 80 mg  80 mg Oral 4x Daily PRN    sod citrate-citric acid (BICITRA) oral solution 30 mL  30 mL Oral 4x Daily PRN    witch hazel-glycerin (TUCKS) topical pad 1 pad  1 pad Topical Q4H PRN     Invasive Devices     Peripheral Intravenous Line            Peripheral IV 06/26/18 Right Forearm less than 1 day                     Olive Collier MD PGY-2   6/27/2018 6:23 AM

## 2018-06-28 PROCEDURE — 99024 POSTOP FOLLOW-UP VISIT: CPT | Performed by: OBSTETRICS & GYNECOLOGY

## 2018-06-28 RX ORDER — IBUPROFEN 600 MG/1
600 TABLET ORAL EVERY 6 HOURS PRN
Qty: 30 TABLET | Refills: 0
Start: 2018-06-28 | End: 2018-07-10

## 2018-06-28 RX ORDER — OXYCODONE HYDROCHLORIDE 10 MG/1
10 TABLET ORAL EVERY 4 HOURS PRN
Status: DISCONTINUED | OUTPATIENT
Start: 2018-06-28 | End: 2018-06-29 | Stop reason: HOSPADM

## 2018-06-28 RX ORDER — OXYCODONE HYDROCHLORIDE 5 MG/1
5 TABLET ORAL EVERY 4 HOURS PRN
Status: DISCONTINUED | OUTPATIENT
Start: 2018-06-28 | End: 2018-06-29 | Stop reason: HOSPADM

## 2018-06-28 RX ORDER — OXYCODONE HYDROCHLORIDE 5 MG/1
5 TABLET ORAL EVERY 4 HOURS PRN
Qty: 20 TABLET | Refills: 0 | Status: SHIPPED | OUTPATIENT
Start: 2018-06-28 | End: 2018-07-08

## 2018-06-28 RX ORDER — ACETAMINOPHEN 325 MG/1
650 TABLET ORAL EVERY 6 HOURS PRN
Qty: 30 TABLET | Refills: 0
Start: 2018-06-28 | End: 2018-07-10

## 2018-06-28 RX ORDER — SIMETHICONE 80 MG
80 TABLET,CHEWABLE ORAL 4 TIMES DAILY PRN
Qty: 30 TABLET | Refills: 0
Start: 2018-06-28 | End: 2018-07-10

## 2018-06-28 RX ORDER — DOCUSATE SODIUM 100 MG/1
100 CAPSULE, LIQUID FILLED ORAL 2 TIMES DAILY PRN
Qty: 10 CAPSULE | Refills: 0
Start: 2018-06-28 | End: 2018-07-10

## 2018-06-28 RX ADMIN — DOCUSATE SODIUM 100 MG: 100 CAPSULE, LIQUID FILLED ORAL at 17:55

## 2018-06-28 RX ADMIN — Medication 325 MG: at 06:35

## 2018-06-28 RX ADMIN — LEVOTHYROXINE SODIUM 75 MCG: 75 TABLET ORAL at 06:35

## 2018-06-28 RX ADMIN — CHOLECALCIFEROL TAB 10 MCG (400 UNIT) 400 UNITS: 10 TAB at 10:27

## 2018-06-28 RX ADMIN — Medication 1 TABLET: at 10:27

## 2018-06-28 RX ADMIN — IBUPROFEN 600 MG: 600 TABLET ORAL at 20:31

## 2018-06-28 RX ADMIN — IBUPROFEN 600 MG: 600 TABLET ORAL at 07:32

## 2018-06-28 RX ADMIN — IBUPROFEN 600 MG: 600 TABLET ORAL at 14:33

## 2018-06-28 NOTE — SOCIAL WORK
Breast pump consult rec'd under baby's record  Spoke with pt and FOB/ Sincer ([phone]) who request Spectra S2 pump from [employer]  As per pt and FOB request, spoke with Rudolph Plunkett at [facility] (p: [phone]) to place order (ref# [id number]) and then Rx faxed to [employer] (f: [phone]) for processing  Pt and FOB aware that pump will be processed and shipped to home in next several days and to anticipate home delivery  Pt and FOB agreeable to same  FOB has [employer] contact info  Family denies any other CM needs  No other CM needs noted

## 2018-06-28 NOTE — PROGRESS NOTES
Progress Note - OB/GYN   Stoney Mccormick 28 y o  female MRN: [id number]  Unit/Bed#:  314-01 Encounter: [id number]    Assessment:  Postop Day #2 s/p 1LTCS, stable  Hgb 13 3 --> 10 4g/dL  Spontaneously voiding    Plan:    1) Continue routine post partum/post op care   Encourage ambulation   Encourage breastfeeding   Anticipate discharge tomorrow    Subjective/Objective   Chief Complaint:     Post delivery  Patient is feeling well  Lochia WNL  Pain well controlled  Subjective:     Pain: yes, incisional, improved with meds  Tolerating PO: yes  Voiding: yes  Flatus: yes  BM: no  Ambulating: yes  Breastfeeding:  yes  Chest pain: no  Shortness of breath: no  Leg pain: no  Lochia: minimal    Objective:     Vitals: BP (!) 92/49   Pulse 85   Temp 98 9 °F (37 2 °C) (Oral)   Resp 16   Ht 4' 10" (1 473 m)   Wt 59 kg (130 lb)   LMP 09/18/2017   SpO2 98%   Breastfeeding?  Yes   BMI 27 17 kg/m²       Intake/Output Summary (Last 24 hours) at 06/28/18 0631  Last data filed at 06/27/18 1701   Gross per 24 hour   Intake                0 ml   Output             1100 ml   Net            -1100 ml       Physical Exam:     AAOx3, NAD  CV, RRR  CTA b/l, no WRR  Soft, non-tender, non-distended, no rebound or guarding, no CVA tenderness  Uterine fundus firm and non-tender, -1 cm below the umbilicus   Incision clean/dry/intact without signs of inflammation   sutures: clean, dry, and intact  Non tender      Lab Results   Component Value Date    WBC 14 34 (H) 06/27/2018    HGB 10 4 (L) 06/27/2018    HCT 31 8 (L) 06/27/2018    MCV 98 06/27/2018     06/27/2018                         Paz Marie MD  6/28/2018  6:31 AM

## 2018-06-28 NOTE — LACTATION NOTE
This note was copied from a baby's chart  Baby with a lot of mucous from  delivery  Alert and active  Hand expressed into baby's mouth and no indication of baby wanting to nurse  Baby placed on right breast using football hold, a few sucks but no latch at this time  Grandma at bedside appears supportive caring for mom and baby  Encouraged MOB to call for assistance, questions, and concerns about breastfeeding  Extension provided

## 2018-06-29 VITALS
BODY MASS INDEX: 27.29 KG/M2 | TEMPERATURE: 98.2 F | WEIGHT: 130 LBS | HEIGHT: 58 IN | SYSTOLIC BLOOD PRESSURE: 112 MMHG | DIASTOLIC BLOOD PRESSURE: 82 MMHG | RESPIRATION RATE: 20 BRPM | HEART RATE: 76 BPM | OXYGEN SATURATION: 98 %

## 2018-06-29 PROCEDURE — 99024 POSTOP FOLLOW-UP VISIT: CPT | Performed by: OBSTETRICS & GYNECOLOGY

## 2018-06-29 RX ADMIN — CHOLECALCIFEROL TAB 10 MCG (400 UNIT) 400 UNITS: 10 TAB at 09:41

## 2018-06-29 RX ADMIN — LEVOTHYROXINE SODIUM 75 MCG: 75 TABLET ORAL at 05:55

## 2018-06-29 RX ADMIN — Medication 1 TABLET: at 09:41

## 2018-06-29 RX ADMIN — IBUPROFEN 600 MG: 600 TABLET ORAL at 15:24

## 2018-06-29 RX ADMIN — Medication 325 MG: at 09:41

## 2018-06-29 RX ADMIN — IBUPROFEN 600 MG: 600 TABLET ORAL at 09:41

## 2018-06-29 RX ADMIN — DOCUSATE SODIUM 100 MG: 100 CAPSULE, LIQUID FILLED ORAL at 09:41

## 2018-06-29 NOTE — LACTATION NOTE
This note was copied from a baby's chart  Mom called for assistance with syring feeding at this time  States she is in too much pain to placed infant to the breast   20mL of pumped milk at bedside  Discussed syringe feeding only if volume less than 10 mL  Demonstrated paced bottle feeding at this time and discussed how this is a better plan for home if Mom will not be placing infant to the breast each time  Will f/u for a latch consult later and to discussed DC instructions with Dad present to translate

## 2018-06-29 NOTE — LACTATION NOTE
This note was copied from a baby's chart  Met with mother and father to go over feeding log since birth for the first week  Emphasized 8 or more (12) feedings in a 24 hour period, what to expect for the number of diapers per day of life and the progression of properties of the  stooling pattern  Discussed s/s that breastfeeding is going well after day 4 and when to get help from a pediatrician or lactation support person after day 4  Booklet included Breast Pumping Instructions, When You Go Back to Work or School, and Breastfeeding Resources for after discharge including access to the number for the [employer]  Discussed s/s engorgement and how to manage with medications and cool compresses as well as s/s mastitis and when to contact physician  Talked with MOB about paced feeding along with hand out on positioning and rational to assist infant making transition back and forth between breast and bottle feeding more effective  Mom latched infant independently prior to my arrival at this time  Baby nursing well, no pain with latch  Enc to continue to put her to the breast as she cues and pump if not offering the breast  [facility] for any [facility]ation needs after DC

## 2018-06-29 NOTE — PROGRESS NOTES
Progress Note - OB/GYN   Stoney Mccormick 28 y o  female MRN: [id number]  Unit/Bed#: -01 Encounter: [id number]    Assessment:  28 y o  [place] s/p Primary low transverse  section for transverse lie post-operative day 3  Patient recovering well, Stable  Discharge today, 18    Plan:  Continue routine post partum care  Pain management PRN  Encourage ambulation  Encourage incentive spirometry  Encourage breastfeeding    Subjective/Objective   Chief Complaint:    Postpartum state    Subjective:   Pain: yes, incisional, improved with percocet and motrin  Tolerating PO: yes  Voiding: yes  Flatus: yes  BM: no  Ambulating: yes  Breastfeeding:  yes  Chest pain: no  Shortness of breath: no  Leg pain: no  Lochia: minimal    Objective:     Vitals: Temp:  [97 9 °F (36 6 °C)-99 4 °F (37 4 °C)] 97 9 °F (36 6 °C)  HR:  [] 65  Resp:  [18-20] 18  BP: ()/(51-63) 100/55     Physical Exam:   General: NAD, alert, oriented  Cardio: Regular rate and rhythm, no murmur  Resp: nonlabored breathing, clear to auscultation bilaterally  Abdomen: Soft, no distension/rebound/guarding, appropriate tenderness   Fundus: Firm, non-tender, fundus: 1 cm below umbilicus  Incision: running absorbable sutures with overlying histoacryl C/D/I, no erythema or discharge noted  G/U: minimal lochia noted on pad  Lower Extremities: Non-tender, no palpable cords    Medications:  Current Facility-Administered Medications   Medication Dose Route Frequency    acetaminophen (TYLENOL) tablet 650 mg  650 mg Oral Q6H PRN    aluminum-magnesium hydroxide-simethicone (MYLANTA) 200-200-20 mg/5 mL oral suspension 15 mL  15 mL Oral Q6H PRN    benzocaine-menthol-lanolin-aloe (DERMOPLAST) 20-0 5 % topical spray 1 application  1 application Topical C6V PRN    bisacodyl (DULCOLAX) rectal suppository 10 mg  10 mg Rectal Daily PRN    calcium carbonate (TUMS) chewable tablet 1,000 mg  1,000 mg Oral Daily PRN    cholecalciferol (VITAMIN D3) tablet 400 Units  400 Units Oral Daily    diphenhydrAMINE (BENADRYL) tablet 25 mg  25 mg Oral Q6H PRN    docusate sodium (COLACE) capsule 100 mg  100 mg Oral BID PRN    ferrous sulfate tablet 325 mg  325 mg Oral Daily With Breakfast    hydrocortisone 1 % cream 1 application  1 application Topical Daily PRN    ibuprofen (MOTRIN) tablet 600 mg  600 mg Oral Q6H PRN    lactated ringers infusion  125 mL/hr Intravenous Continuous    lactated ringers infusion  125 mL/hr Intravenous Continuous    levothyroxine tablet 75 mcg  75 mcg Oral Early Morning    metoclopramide (REGLAN) injection 10 mg  10 mg Intravenous Once PRN    ondansetron (ZOFRAN) injection 4 mg  4 mg Intravenous Once PRN    ondansetron (ZOFRAN) injection 4 mg  4 mg Intravenous Q8H PRN    oxyCODONE (ROXICODONE) immediate release tablet 10 mg  10 mg Oral Q4H PRN    oxyCODONE (ROXICODONE) IR tablet 5 mg  5 mg Oral Q4H PRN    prenatal multivitamin tablet 1 tablet  1 tablet Oral Daily    senna (SENOKOT) tablet 8 6 mg  1 tablet Oral HS PRN    simethicone (MYLICON) chewable tablet 80 mg  80 mg Oral 4x Daily PRN    sod citrate-citric acid (BICITRA) oral solution 30 mL  30 mL Oral 4x Daily PRN    witch hazel-glycerin (TUCKS) topical pad 1 pad  1 pad Topical Q4H PRN     Farhan Cunha  6/29/2018  6:39 AM

## 2018-07-02 ENCOUNTER — TRANSITIONAL CARE MANAGEMENT (OUTPATIENT)
Dept: FAMILY MEDICINE CLINIC | Facility: CLINIC | Age: 28
End: 2018-07-02

## 2018-07-03 ENCOUNTER — TRANSITIONAL CARE MANAGEMENT (OUTPATIENT)
Dept: FAMILY MEDICINE CLINIC | Facility: CLINIC | Age: 28
End: 2018-07-03

## 2018-07-03 LAB — PLACENTA IN STORAGE: NORMAL

## 2018-07-05 ENCOUNTER — TRANSITIONAL CARE MANAGEMENT (OUTPATIENT)
Dept: FAMILY MEDICINE CLINIC | Facility: CLINIC | Age: 28
End: 2018-07-05

## 2018-07-10 ENCOUNTER — POSTPARTUM VISIT (OUTPATIENT)
Dept: OBGYN CLINIC | Facility: CLINIC | Age: 28
End: 2018-07-10

## 2018-07-10 VITALS — BODY MASS INDEX: 23.78 KG/M2 | DIASTOLIC BLOOD PRESSURE: 64 MMHG | WEIGHT: 113.8 LBS | SYSTOLIC BLOOD PRESSURE: 98 MMHG

## 2018-07-10 DIAGNOSIS — Z48.89 POSTOPERATIVE VISIT: Primary | ICD-10-CM

## 2018-07-10 DIAGNOSIS — Z98.891 S/P CESAREAN SECTION: ICD-10-CM

## 2018-07-10 PROCEDURE — 99024 POSTOP FOLLOW-UP VISIT: CPT | Performed by: OBSTETRICS & GYNECOLOGY

## 2018-07-10 NOTE — PROGRESS NOTES
Assessment   Stoney was seen today for incision check  Diagnoses and all orders for this visit:    Postoperative visit    S/P  section         Doing well postoperatively  Plan    1  Continue current medications  2  Wound care discussed  3  Increase activity as tolerated  4  Anticipated return to work: 8 - 12 weeks post partum  5  Follow up at 6 weeks post delivery for postpartum checkup  Diamond Casey is a 28 y o  y o  female who presents approximately one week post discharge following a post  on 18  She is eating a regular diet without difficulty  Bowel movements are normal  Pain is controlled with current analgesics  Medications being used: ibuprofen (OTC)  The patient is breastfeeding  She is having some breast feeding issues, encouraged [facility] for [facility]ation consult  Continue levothyroxine for now  Will recheck labs at 6 week post partum visit  Discussed incisional pain/pulling  The following portions of the patient's history were reviewed and updated as appropriate: allergies, current medications, past family history, past medical history, past social history, past surgical history and problem list     Allergies  Clomiphene    Medications    Current Outpatient Prescriptions:     cholecalciferol (VITAMIN D3) 1,000 units tablet, Take by mouth, Disp: , Rfl:     ferrous sulfate (IRON SUPPLEMENT) 325 (65 Fe) mg tablet, Take by mouth, Disp: , Rfl:     Prenatal Multivit-Min-Fe-FA (PRENATAL VITAMINS) 0 8 MG tablet, Take by mouth, Disp: , Rfl:     levothyroxine 75 mcg tablet, Take 1 tablet (75 mcg total) by mouth daily, Disp: 90 tablet, Rfl: 1      Review of Systems  Denies Fevers/chills/N/V/constipation/ excessive vaginal bleeding/excessive pain/dysuria/frequency of urination  Also as noted in HPI        Objective     BP 98/64   Wt 51 6 kg (113 lb 12 8 oz)   LMP 2017   BMI 23 78 kg/m²     General: alert and oriented, in no acute distress  Abdomen: soft, non-tender  Incision: healing well, no drainage, no erythema, no hernia, no seroma, no swelling, no dehiscence, incision well approximated

## 2018-07-16 ENCOUNTER — TELEPHONE (OUTPATIENT)
Dept: OBGYN CLINIC | Facility: CLINIC | Age: 28
End: 2018-07-16

## 2018-07-16 NOTE — TELEPHONE ENCOUNTER
Warm compresses to breast  Coconut oil post breastfeeding  F/U with lactation consultant at [facility]

## 2018-07-16 NOTE — TELEPHONE ENCOUNTER
Dellar Apley had left a message for us to return his call  I called back and he stated that Stoney has started to breast feed the baby  She has done so for past 2 days  Now she notices "pimply" looking areas around nipple  On one breast they are white, on the other they are transparent  Told Sincer that after feeding the baby she has some pain, other than that nothing    What should they do for these "pimply" areas on her breast

## 2018-07-17 ENCOUNTER — TELEPHONE (OUTPATIENT)
Dept: OBGYN CLINIC | Facility: CLINIC | Age: 28
End: 2018-07-17

## 2018-07-17 DIAGNOSIS — Z98.891 S/P CESAREAN SECTION: ICD-10-CM

## 2018-07-17 DIAGNOSIS — O40.3XX0 POLYHYDRAMNIOS IN THIRD TRIMESTER COMPLICATION, SINGLE OR UNSPECIFIED FETUS: ICD-10-CM

## 2018-07-17 DIAGNOSIS — Z3A.38 38 WEEKS GESTATION OF PREGNANCY: ICD-10-CM

## 2018-07-17 NOTE — TELEPHONE ENCOUNTER
Patient states they are feeling "good "   How is your vaginal bleeding? No   Have you recently had any heavy vaginal bleeding or large clots? No  Do you have any issues with your bowels or bladder? No  Vaginal delivery - Any issues with stitches? C/S Any issues with your incision? No     Redness or drainage? How is the baby doing? Fine  Breastfeeding? Yes  Any issues? No   Have you seen lactation consultant? n/a    How have your moods been? Good  Do you feel very anxious or overwhelmed? No    EPDS - Unable to asses, pt did not understand questions  Are you able to sleep when the baby rests? Yes  Did you schedule your postpartum appointment? Yes    I would like to review the warning signs to call the office in the next few weeks  Please call us if any of these occur:    Mastitis: If you experience breast pain and redness with high fevers and body aches, please call the office for an appointment  We recommend that you continue to breastfeed or pump if experiencing these symptoms as this will be recommended if mastitis is diagnosed  Depression: If you are feeling sad or overwhelmed that you are unable to care for yourself or the baby, you are very unhappy and crying all the time, you are very anxious or worried for no reason or have thoughts of harming yourself or the baby, please call the office immediately or go to the nearest emergency room  Infection: If you experience severe abdominal or pelvic pain with fevers above 101 0F  If had a , any foul-smelling drainage, fevers, redness or pain that is not controlled with pain medication in the area of the incision is encourage to call the office  Abnormal vaginal bleeding: Your vaginal bleeding will gradually slow down, but can continue up to 6 weeks  If you are experiencing heavy bleeding where you are bleeding through more than a pad an hour or continually passing large clots, please call the office for an evaluation       Patient verbalized understanding  Routing to provider to update

## 2018-08-21 ENCOUNTER — POSTPARTUM VISIT (OUTPATIENT)
Dept: OBGYN CLINIC | Facility: CLINIC | Age: 28
End: 2018-08-21

## 2018-08-21 VITALS — WEIGHT: 114.2 LBS | BODY MASS INDEX: 23.87 KG/M2 | DIASTOLIC BLOOD PRESSURE: 64 MMHG | SYSTOLIC BLOOD PRESSURE: 104 MMHG

## 2018-08-21 DIAGNOSIS — Z98.891 S/P CESAREAN SECTION: ICD-10-CM

## 2018-08-21 PROBLEM — Z3A.38 38 WEEKS GESTATION OF PREGNANCY: Status: RESOLVED | Noted: 2018-02-20 | Resolved: 2018-08-21

## 2018-08-21 PROBLEM — O31.10X0 VANISHING TWIN SYNDROME: Status: RESOLVED | Noted: 2018-01-11 | Resolved: 2018-08-21

## 2018-08-21 PROBLEM — O40.3XX0 POLYHYDRAMNIOS IN THIRD TRIMESTER: Status: RESOLVED | Noted: 2018-05-07 | Resolved: 2018-08-21

## 2018-08-21 PROCEDURE — 99024 POSTOP FOLLOW-UP VISIT: CPT | Performed by: OBSTETRICS & GYNECOLOGY

## 2018-08-21 NOTE — PROGRESS NOTES
Assessment/Plan     Normal postpartum exam      1  Contraception: none  2  Annual exam due in January  3  Breastfeeding going well  4  Increase activity as tolerated, may resume all normal activity  5  Anticipated return to work: 6 - 12 weeks post partum  Ellie Evans is a 28 y o  female who presents for a postpartum visit  She is 7 weeks postpartum following a repeat  section, low transverse incision  I have fully reviewed the prenatal and intrapartum course  The delivery was at 39 2 gestational weeks  Anesthesia: epidural  Laceration: n/a  Bleeding: period started last week    Bowel function is normal  Bladder function is normal  Patient has not been sexually active  Desired contraception method is none  Postpartum depression screening: negative  EPDS : 0    Pediatrician: Jeff Hou course has been uncomplicated     Baby is feeding by breast     Last Pap :  2018; no abnormalities  Gestational Diabetes: no  Pregnancy Complications: polyhydramnios, transverse lie    The following portions of the patient's history were reviewed and updated as appropriate: allergies, current medications, past family history, past medical history, past social history, past surgical history and problem list       Current Outpatient Prescriptions:     cholecalciferol (VITAMIN D3) 1,000 units tablet, Take by mouth, Disp: , Rfl:     ferrous sulfate (IRON SUPPLEMENT) 325 (65 Fe) mg tablet, Take by mouth, Disp: , Rfl:     levothyroxine 75 mcg tablet, Take 1 tablet (75 mcg total) by mouth daily, Disp: 90 tablet, Rfl: 1    Prenatal Multivit-Min-Fe-FA (PRENATAL VITAMINS) 0 8 MG tablet, Take by mouth, Disp: , Rfl:     Allergies   Allergen Reactions    Clomiphene Other (See Comments)     CLOMID   Per pt if pt would move hand in front she will see images of her hand still moving       Review of Systems  Constitutional: no fever, feels well  Breasts: no complaints of breast pain, breast lump, or nipple discharge  Gastrointestinal: no complaints nausea, vomiting  Genitourinary: as noted in HPI  Neurological: no complaints of headache      Objective      /64   Wt 51 8 kg (114 lb 3 2 oz)   LMP 09/18/2017   Breastfeeding?  Yes   BMI 23 87 kg/m²     OBGyn Exam  General: alert and oriented, in no acute distress  Abdomen: soft and nondistended  Incision: clean, dry, and intact

## 2019-11-01 NOTE — PROGRESS NOTES
Feeling well    Reviewed PTL precautions  Reviewed level 2 US - repeat at 32wks for missed heart views  Gave 28wk labs and TSH  Reassured pt that red lines were stretch marks Principal Discharge DX:	Gallstone pancreatitis

## (undated) DEVICE — ADHESIVE SKN CLSR HISTOACRYL FLEX 0.5ML LF

## (undated) DEVICE — SUT MONOCRYL 0 CTX 36 IN Y398H

## (undated) DEVICE — SUT MONOCRYL 4-0 PS-2 27 IN Y426H

## (undated) DEVICE — SUT PLAIN 3-0 CT-1 27 IN 842H

## (undated) DEVICE — PACK C-SECTION PBDS

## (undated) DEVICE — GLOVE INDICATOR PI UNDERGLOVE SZ 7.5 BLUE

## (undated) DEVICE — Device

## (undated) DEVICE — CHLORAPREP HI-LITE 26ML ORANGE

## (undated) DEVICE — SUT VICRYL 0 CT-1 36 IN J946H

## (undated) DEVICE — SKIN MARKER DUAL TIP WITH RULER CAP, FLEXIBLE RULER AND LABELS: Brand: DEVON

## (undated) DEVICE — GLOVE SRG BIOGEL ECLIPSE 7